# Patient Record
Sex: FEMALE | Race: WHITE | NOT HISPANIC OR LATINO | URBAN - METROPOLITAN AREA
[De-identification: names, ages, dates, MRNs, and addresses within clinical notes are randomized per-mention and may not be internally consistent; named-entity substitution may affect disease eponyms.]

---

## 2022-09-02 ENCOUNTER — TRANSFERRED RECORDS (OUTPATIENT)
Dept: HEALTH INFORMATION MANAGEMENT | Facility: CLINIC | Age: 37
End: 2022-09-02

## 2022-09-02 ENCOUNTER — MEDICAL CORRESPONDENCE (OUTPATIENT)
Dept: HEALTH INFORMATION MANAGEMENT | Facility: CLINIC | Age: 37
End: 2022-09-02

## 2023-01-17 ENCOUNTER — OFFICE VISIT (OUTPATIENT)
Dept: RHEUMATOLOGY | Facility: CLINIC | Age: 38
End: 2023-01-17
Payer: COMMERCIAL

## 2023-01-17 ENCOUNTER — ANCILLARY PROCEDURE (OUTPATIENT)
Dept: GENERAL RADIOLOGY | Facility: CLINIC | Age: 38
End: 2023-01-17
Attending: PHYSICIAN ASSISTANT
Payer: COMMERCIAL

## 2023-01-17 VITALS
WEIGHT: 147 LBS | SYSTOLIC BLOOD PRESSURE: 122 MMHG | DIASTOLIC BLOOD PRESSURE: 66 MMHG | BODY MASS INDEX: 22.28 KG/M2 | HEIGHT: 68 IN

## 2023-01-17 DIAGNOSIS — Z87.39 HISTORY OF JUVENILE RHEUMATOID ARTHRITIS: ICD-10-CM

## 2023-01-17 DIAGNOSIS — M25.50 POLYARTHRALGIA: Primary | ICD-10-CM

## 2023-01-17 DIAGNOSIS — M25.50 POLYARTHRALGIA: ICD-10-CM

## 2023-01-17 LAB
ALBUMIN SERPL BCG-MCNC: 4.8 G/DL (ref 3.5–5.2)
ALT SERPL W P-5'-P-CCNC: 17 U/L (ref 10–35)
AST SERPL W P-5'-P-CCNC: 20 U/L (ref 10–35)
CREAT SERPL-MCNC: 0.65 MG/DL (ref 0.51–0.95)
CRP SERPL-MCNC: <3 MG/L
ERYTHROCYTE [DISTWIDTH] IN BLOOD BY AUTOMATED COUNT: 12.1 % (ref 10–15)
ERYTHROCYTE [SEDIMENTATION RATE] IN BLOOD BY WESTERGREN METHOD: 10 MM/HR (ref 0–20)
GFR SERPL CREATININE-BSD FRML MDRD: >90 ML/MIN/1.73M2
HCT VFR BLD AUTO: 36.7 % (ref 35–47)
HGB BLD-MCNC: 13.4 G/DL (ref 11.7–15.7)
MCH RBC QN AUTO: 31.1 PG (ref 26.5–33)
MCHC RBC AUTO-ENTMCNC: 36.5 G/DL (ref 31.5–36.5)
MCV RBC AUTO: 85 FL (ref 78–100)
PLATELET # BLD AUTO: 171 10E3/UL (ref 150–450)
RBC # BLD AUTO: 4.31 10E6/UL (ref 3.8–5.2)
WBC # BLD AUTO: 6.8 10E3/UL (ref 4–11)

## 2023-01-17 PROCEDURE — 82040 ASSAY OF SERUM ALBUMIN: CPT | Performed by: PHYSICIAN ASSISTANT

## 2023-01-17 PROCEDURE — 84450 TRANSFERASE (AST) (SGOT): CPT | Performed by: PHYSICIAN ASSISTANT

## 2023-01-17 PROCEDURE — 36415 COLL VENOUS BLD VENIPUNCTURE: CPT | Performed by: PHYSICIAN ASSISTANT

## 2023-01-17 PROCEDURE — 99204 OFFICE O/P NEW MOD 45 MIN: CPT | Performed by: PHYSICIAN ASSISTANT

## 2023-01-17 PROCEDURE — 73130 X-RAY EXAM OF HAND: CPT | Mod: TC | Performed by: RADIOLOGY

## 2023-01-17 PROCEDURE — 73110 X-RAY EXAM OF WRIST: CPT | Mod: TC | Performed by: RADIOLOGY

## 2023-01-17 PROCEDURE — 84460 ALANINE AMINO (ALT) (SGPT): CPT | Performed by: PHYSICIAN ASSISTANT

## 2023-01-17 PROCEDURE — 86200 CCP ANTIBODY: CPT | Performed by: PHYSICIAN ASSISTANT

## 2023-01-17 PROCEDURE — 85027 COMPLETE CBC AUTOMATED: CPT | Performed by: PHYSICIAN ASSISTANT

## 2023-01-17 PROCEDURE — 82565 ASSAY OF CREATININE: CPT | Performed by: PHYSICIAN ASSISTANT

## 2023-01-17 PROCEDURE — 86431 RHEUMATOID FACTOR QUANT: CPT | Performed by: PHYSICIAN ASSISTANT

## 2023-01-17 PROCEDURE — 86140 C-REACTIVE PROTEIN: CPT | Performed by: PHYSICIAN ASSISTANT

## 2023-01-17 PROCEDURE — 85652 RBC SED RATE AUTOMATED: CPT | Performed by: PHYSICIAN ASSISTANT

## 2023-01-17 RX ORDER — VALACYCLOVIR HYDROCHLORIDE 1 G/1
1000 TABLET, FILM COATED ORAL
COMMUNITY
Start: 2022-09-16 | End: 2024-04-11

## 2023-01-17 RX ORDER — PREDNISONE 5 MG/1
TABLET ORAL
Qty: 32 TABLET | Refills: 0 | Status: SHIPPED | OUTPATIENT
Start: 2023-01-17 | End: 2023-06-29

## 2023-01-17 RX ORDER — BUPROPION HYDROCHLORIDE 150 MG/1
150 TABLET ORAL DAILY
COMMUNITY
Start: 2023-01-03 | End: 2024-04-11

## 2023-01-17 RX ORDER — TRETINOIN 0.25 MG/G
CREAM TOPICAL
COMMUNITY
Start: 2022-08-30

## 2023-01-17 RX ORDER — SPIRONOLACTONE 25 MG/1
1 TABLET ORAL DAILY
COMMUNITY
Start: 2022-12-07 | End: 2024-04-11

## 2023-01-17 RX ORDER — METRONIDAZOLE 500 MG/1
500 TABLET ORAL
COMMUNITY
Start: 2023-01-17 | End: 2023-01-24

## 2023-01-17 RX ORDER — MEDROXYPROGESTERONE ACETATE 150 MG/ML
150 INJECTION, SUSPENSION INTRAMUSCULAR
COMMUNITY
Start: 2023-01-16 | End: 2024-04-11

## 2023-01-17 NOTE — PROGRESS NOTES
"Rheumatology Clinic Visit  [facility]  Sagrario Stone, ESSENCE     Kailyn Diop MRN# [id number]   YOB: 1985 Age: 37 year old   Date of Visit: 01/17/2023  Primary care provider: No primary care provider on file.          Assessment and Plan:     1. Polyarthralgia  2. History of juvenile rheumatoid arthritis    Patient presents today for an initial evaluation of polyarthralgia.  She states that she has been seeing orthopedic for bilateral foot/ankle pain.  A CT scan was done and the report states that she has \"arthritis\".  Per patient she asked if this meant osteoarthritis and was told that it did not.  She did get cortisone injections and states that this was significantly helpful however she has been noticing increased pain in her bilateral feet/ankles.  She started to notice some pain in her wrists as well.  She was diagnosed with juvenile rheumatoid arthritis and has been off of treatment since age 18 with no recurrent symptoms until recent.  Per patient she was seronegative as a child.  Physical examination did not show any active synovitis, dactylitis, tenosynovitis or enthesopathy.  She had full joint range of motion.  Normal  strength.  Full fist formation.    Would recommend further evaluating serologies.  I would recommend a short course of low-dose prednisone given the increasing joint symptoms.  We will have the patient follow-up with me in 1 month.  At which time we will review how she did with the oral prednisone.  We will review the results of her labs and x-rays as well.  We will discuss potentially utilizing immunomodulatory therapy at that visit as well.    Plan:     1. Schedule follow-up with Sagrario Stone PA-C in 1 months.   2. Imaging: xray hand and wrists  3. Labs: CBC, Creatinine, Albumin, AST, ALT, CCP antibody, Rheumatoid factor, CRP and Sed rate today  4. Medication recommendations:   a. Prednisone 5mg: Take 10 mg (2 tablets) daily x1 week, then take 7.5 mg (1.5 " tablets) daily x1 week, then take 5 mg (1 tablet) daily x1 week then stop.  Take with food.  b. # Prednisone Risks and Benefits: The risks and benefits of prednisone were discussed in detail and the patient verbalized understanding.  The risks discussed include, but are not limited to, weight gain, fluid retention, impaired wound healing, hyperglycemia, adrenal suppression, GI upset, peptic ulcer, hepatotoxicity, aseptic necrosis of the femoral and humeral heads, osteoporosis, myopathy, tendon rupture (particularly Achilles tendon), ocular changes including an increased intraocular pressure.  I encouraged reviewing the package insert and asking any questions about the medication.      Sagrario Stone, [facility]  Rheumatology         History of Present Illness:   Kailyn Diop presents for evaluation of ankle/foot pain. 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     She reports that she stopped treating arthritis around age 18. She has had 3 pregnancies in the last 6 years. She states that with the added weight of pregnancy, she would have increased pain in her foot and ankles. She was seen by [place]. A CT scan was done to see if the tendons were the cause. She was told there was arthritis and she should see Rheum. She was given cortisone injections and states that this was very helpful. She states that the pain is starting to come back. It is not at the level that it was at before. She has some swelling around the ball of her ankle. She states that she has some wrist issues as well as she is unable to bear too much weight on them. She states that her ankles have always been stiff. She has some in her fingers as well. She feels that her joints feel similar to what they did when she was a child. No oral prednisone recently.No skin rashes or mouth sores. No hair loss. No history of pericarditis or pleuritis. No cancer history.     For her DAKSHA, she had issues with her ankles, knees and wrists. She had reconstructive jaw surgery due  to the activity in her jaw. She states that the last thing she was on was Enbrel. She states that she was on Methotrexate (SubQ), plaquenil, Prednisone and sulindac. No allergic reactions to these medications.          Review of Systems:     Constitutional: negative  Skin: negative  Eyes: negative  Ears/Nose/Throat: negative  Respiratory: No shortness of breath, dyspnea on exertion, cough, or hemoptysis  Cardiovascular: negative  Gastrointestinal: negative  Genitourinary: negative  Musculoskeletal: as above  Neurologic: negative  Psychiatric: negative  Hematologic/Lymphatic/Immunologic: negative  Endocrine: negative         Active Problem List:   There are no problems to display for this patient.           Past Medical History:   No past medical history on file.  No past surgical history on file.         Social History:     Social History     Socioeconomic History     Marital status:      Spouse name: Not on file     Number of children: Not on file     Years of education: Not on file     Highest education level: Not on file   Occupational History     Not on file   Tobacco Use     Smoking status: Not on file     Smokeless tobacco: Not on file   Substance and Sexual Activity     Alcohol use: Not on file     Drug use: Not on file     Sexual activity: Not on file   Other Topics Concern     Not on file   Social History Narrative     Not on file     Social Determinants of Health     Financial Resource Strain: Not on file   Food Insecurity: Not on file   Transportation Needs: Not on file   Physical Activity: Not on file   Stress: Not on file   Social Connections: Not on file   Intimate Partner Violence: Not on file   Housing Stability: Not on file          Family History:   No family history on file.         Allergies:   Not on File         Medications:     No current outpatient medications on file.            Physical Exam:   There were no vitals taken for this visit.  Wt Readings from Last 6 Encounters:   No data  found for Wt     Constitutional: well-developed, appearing stated age; cooperative  Eyes: nl PERRLA, conjunctiva, sclera  ENT: nl external ears, nose, hearing, lips, teeth, gums, throat. No mucositis.   No mucous membrane lesions, normal saliva pool  Neck: no mass or thyroid enlargement  Resp: no shortness of breath with normal conversation  CV: no edema  Lymph: no cervical, supraclavicular or epitrochlear nodes  MS: The TMJ, neck, shoulder, elbow, wrist, MCP/PIP/DIP, spine, hip, knee, ankle, and foot MTP/IP joints were examined and found normal. No active synovitis or altered joint anatomy. Full joint ROM. Normal  strength. No dactylitis,  tenosynovitis, enthesopathy.   Skin: no nail pitting, alopecia, rash, nodules or lesions.   Neuro: nl cranial nerves.   Psych: nl judgement, orientation, memory, affect.           Data:   Imaging:  CT Bilateral Ankles  Impression:  1. Bilateral subtalar and talonavicular arthritis left greater than right  2. Subtle cavovarus foot deformity bilaterally  3. Bilateral peroneal tendinitis left greater than right  4. Bilateral gastrocnemius contractures    Laboratory:  None recent

## 2023-01-17 NOTE — PATIENT INSTRUCTIONS
After Visit Instructions:     Thank you for coming to [facility] for your care. It is my goal to partner with you to help you reach your optimal state of health.       Plan:     Schedule follow-up with Sagrario Stone PA-C in 1 months.   Imaging: xray hand and wrists  Labs: CBC, Creatinine, Albumin, AST, ALT, CCP antibody, Rheumatoid factor, CRP and Sed rate today  Medication recommendations:   Prednisone 5mg: Take 10 mg (2 tablets) daily x1 week, then take 7.5 mg (1.5 tablets) daily x1 week, then take 5 mg (1 tablet) daily x1 week then stop.  Take with food.  # Prednisone Risks and Benefits: The risks and benefits of prednisone were discussed in detail and the patient verbalized understanding.  The risks discussed include, but are not limited to, weight gain, fluid retention, impaired wound healing, hyperglycemia, adrenal suppression, GI upset, peptic ulcer, hepatotoxicity, aseptic necrosis of the femoral and humeral heads, osteoporosis, myopathy, tendon rupture (particularly Achilles tendon), ocular changes including an increased intraocular pressure.  I encouraged reviewing the package insert and asking any questions about the medication.        Sagrario Stone PA-C  [facility]  [facility]    Contact information: [facility]  Clinic Number:  [phone]  Please call or send a [employer] message with any questions about your care

## 2023-01-17 NOTE — LETTER
January 19, 2023      Kailyn Diop  [address]  [facility] [address]        Dear ,    We are writing to inform you of your test results.    : labs returned normal as did her xray's. No change to the plan that we created at her visit yesterday. Would recommend a follow up in 1 month.      Sagrario Stone, PAC    Resulted Orders   CRP inflammation   Result Value Ref Range    CRP Inflammation <3.00 <5.00 mg/L   Cyclic Citrullinated Peptide Antibody IgG   Result Value Ref Range    Cyclic Citrullinated Peptide Antibody IgG 1.6 <7.0 U/mL      Comment:      Negative   Erythrocyte sedimentation rate auto   Result Value Ref Range    Erythrocyte Sedimentation Rate 10 0 - 20 mm/hr   Rheumatoid factor   Result Value Ref Range    Rheumatoid Factor <7 <12 IU/mL   CBC with platelets   Result Value Ref Range    WBC Count 6.8 4.0 - 11.0 10e3/uL    RBC Count 4.31 3.80 - 5.20 10e6/uL    Hemoglobin 13.4 11.7 - 15.7 g/dL    Hematocrit 36.7 35.0 - 47.0 %    MCV 85 78 - 100 fL    MCH 31.1 26.5 - 33.0 pg    MCHC 36.5 31.5 - 36.5 g/dL    RDW 12.1 10.0 - 15.0 %    Platelet Count 171 150 - 450 10e3/uL   Creatinine   Result Value Ref Range    Creatinine 0.65 0.51 - 0.95 mg/dL    GFR Estimate >90 >60 mL/min/1.73m2      Comment:      Effective December 21, 2021 eGFRcr in adults is calculated using the 2021 CKD-EPI creatinine equation which includes age and gender (Sondra et al., NEJ, DOI: 10.1056/WXWHkb8724017)   AST   Result Value Ref Range    AST 20 10 - 35 U/L   ALT   Result Value Ref Range    ALT 17 10 - 35 U/L   Albumin level   Result Value Ref Range    Albumin 4.8 3.5 - 5.2 g/dL       If you have any questions or concerns, please call the clinic at the number listed above.       Sincerely,      Sagrario Stone PA-C

## 2023-01-18 LAB
CCP AB SER IA-ACNC: 1.6 U/ML
RHEUMATOID FACT SER NEPH-ACNC: <7 IU/ML

## 2023-06-29 ENCOUNTER — TELEPHONE (OUTPATIENT)
Dept: FAMILY MEDICINE | Facility: OTHER | Age: 38
End: 2023-06-29

## 2023-06-29 ENCOUNTER — LAB (OUTPATIENT)
Dept: LAB | Facility: CLINIC | Age: 38
End: 2023-06-29
Payer: COMMERCIAL

## 2023-06-29 ENCOUNTER — OFFICE VISIT (OUTPATIENT)
Dept: RHEUMATOLOGY | Facility: CLINIC | Age: 38
End: 2023-06-29
Payer: COMMERCIAL

## 2023-06-29 VITALS — HEART RATE: 56 BPM | SYSTOLIC BLOOD PRESSURE: 108 MMHG | DIASTOLIC BLOOD PRESSURE: 72 MMHG

## 2023-06-29 DIAGNOSIS — M06.09 SERONEGATIVE RHEUMATOID ARTHRITIS OF MULTIPLE SITES (H): Primary | ICD-10-CM

## 2023-06-29 DIAGNOSIS — Z79.899 HIGH RISK MEDICATION USE: ICD-10-CM

## 2023-06-29 DIAGNOSIS — Z87.39 HISTORY OF JUVENILE RHEUMATOID ARTHRITIS: ICD-10-CM

## 2023-06-29 DIAGNOSIS — M06.09 SERONEGATIVE RHEUMATOID ARTHRITIS OF MULTIPLE SITES (H): ICD-10-CM

## 2023-06-29 LAB
HBV CORE AB SERPL QL IA: NONREACTIVE
HBV SURFACE AG SERPL QL IA: NONREACTIVE
HCV AB SERPL QL IA: NONREACTIVE

## 2023-06-29 PROCEDURE — 87340 HEPATITIS B SURFACE AG IA: CPT

## 2023-06-29 PROCEDURE — 86481 TB AG RESPONSE T-CELL SUSP: CPT

## 2023-06-29 PROCEDURE — 86704 HEP B CORE ANTIBODY TOTAL: CPT

## 2023-06-29 PROCEDURE — 86803 HEPATITIS C AB TEST: CPT

## 2023-06-29 PROCEDURE — 36415 COLL VENOUS BLD VENIPUNCTURE: CPT

## 2023-06-29 PROCEDURE — 99214 OFFICE O/P EST MOD 30 MIN: CPT | Performed by: PHYSICIAN ASSISTANT

## 2023-06-29 RX ORDER — MEDROXYPROGESTERONE ACETATE 150 MG/ML
50 INJECTION, SUSPENSION INTRAMUSCULAR WEEKLY
Qty: 4 ML | Refills: 2 | Status: SHIPPED | OUTPATIENT
Start: 2023-06-29 | End: 2023-09-12

## 2023-06-29 RX ORDER — PREDNISONE 5 MG/1
TABLET ORAL
Qty: 32 TABLET | Refills: 0 | Status: SHIPPED | OUTPATIENT
Start: 2023-06-29 | End: 2024-04-11

## 2023-06-29 NOTE — PATIENT INSTRUCTIONS
After Visit Instructions:     Thank you for coming to [facility] for your care. It is my goal to partner with you to help you reach your optimal state of health.       Plan:     Schedule follow-up with Sagrario Stone PA-C in 3 months.   Labs: Hep B, Hep C and TB today; plan to check a CBC, creatinine, albumin, AST, and ALT after being on the Enbrel for 6 months  Medication recommendations:  Prednisone 5mg: Take 10 mg (2 tablets) daily x1 week, then take 7.5 mg (1.5 tablets) daily x1 week, then take 5 mg (1 tablet) daily x1 week then stop.  Take with food and in the morning.  Enbrel 50mg/ML weekly SubQ  # Etanercept (Enbrel) Risks and Benefits: The risks and benefits of etanercept were discussed in detail and the patient verbalized understanding.  The risks discussed include, but are not limited to, the risk for hypersensitivity, anaphylaxis, anaphylactoid reactions, an increased risk for serious infections leading to hospitalization or death, a possible increased risk for lymphoma and other malignancies, a possible worsening of demyelinating diseases, a possible worsening of heart failure, possible reactivation of hepatitis B, and possible reactivation of latent tuberculosis.  Subcutaneous injections may result in injection site reactions and/or pain at the site of injection.  It was discussed that the medication would need to be discontinued if a serious infection develops.  It was discussed that live vaccinations should not be received while using etanercept or within 30 days prior to starting etanercept.  I encouraged reviewing the package insert and asking any questions about the medication.        Sagrario Stone PA-C  [facility]  [facility]    Contact information: [facility]  Clinic Number:  [phone]  Please call or send a [employer] message with any questions about your care

## 2023-06-29 NOTE — TELEPHONE ENCOUNTER
PA Initiation    Medication: ENBREL SURECLICK 50 MG/ML SC SOAJ  Insurance Company:    Pharmacy Filling the Rx:    Filling Pharmacy Phone:    Filling Pharmacy Fax:    Start Date: 6/29/2023    : [id number]                  DATE 06/29/2023    Spoke to pt she does not have [employer] access yet would like us to email her with approval and copay card info        [email or web address]      GEORGIA Meneses, [facility]  Specialty Pharmacy Clinic Liaison     [facility]    [email or web address]     Phone: [phone]  Fax: [phone]

## 2023-06-29 NOTE — TELEPHONE ENCOUNTER
Prior Authorization Approval    Medication: ENBREL SURECLICK 50 MG/ML SC SOAJ  Authorization Effective Date: 6/29/2023  Authorization Expiration Date: 12/26/2023  Approved Dose/Quantity:  50mg  Reference #:     Insurance Company: [employer] - Phone [phone] Fax [phone]  Expected CoPay:       CoPay Card Available: Yes    Financial Assistance Needed:  Sent via email to pt  Which Pharmacy is filling the prescription: [address]  Pharmacy Notified: Yes  Patient Notified: Yes    GEORGIA Meneses, [facility]  Specialty Pharmacy Clinic Liaison     [facility]    [email or web address]     Phone: [phone]  Fax: [phone]

## 2023-06-29 NOTE — PROGRESS NOTES
Rheumatology Clinic Visit  [facility]  ESSENCE Durán     Kailyn Diop MRN# [id number]   YOB: 1985 Age: 37 year old   Date of Visit: 06/29/2023  Primary care provider: No primary care provider on file.          Assessment and Plan:     1. Seronegative Rheumatoid arthritis  2. History of juvenile rheumatoid arthritis  3. High risk medication use    Patient presents today for follow-up regarding her seronegative rheumatoid arthritis.  She reports significant benefit while being on the prednisone.  Unfortunately with being off of the prednisone her symptoms have returned.  She is interested in another course of prednisone as well as restarting the Enbrel.    Given the increased joint symptoms and her history of DAKSHA along with the improvement with prednisone, we will restart her on Enbrel monotherapy.  We will check her hepatitis B, hepatitis C and tuberculosis status today.  Monitoring labs were normal.  We will check again in 6 months after starting the Enbrel.    Plan:     1. Schedule follow-up with Sagrario Stone PA-C in 3 months.   2. Labs: Hep B, Hep C and TB today; plan to check a CBC, creatinine, albumin, AST, and ALT after being on the Enbrel for 6 months  3. Medication recommendations:  a. Prednisone 5mg: Take 10 mg (2 tablets) daily x1 week, then take 7.5 mg (1.5 tablets) daily x1 week, then take 5 mg (1 tablet) daily x1 week then stop.  Take with food and in the morning.  b. Enbrel 50mg/ML weekly SubQ  i. # Etanercept (Enbrel) Risks and Benefits: The risks and benefits of etanercept were discussed in detail and the patient verbalized understanding.  The risks discussed include, but are not limited to, the risk for hypersensitivity, anaphylaxis, anaphylactoid reactions, an increased risk for serious infections leading to hospitalization or death, a possible increased risk for lymphoma and other malignancies, a possible worsening of demyelinating diseases, a possible worsening  of heart failure, possible reactivation of hepatitis B, and possible reactivation of latent tuberculosis.  Subcutaneous injections may result in injection site reactions and/or pain at the site of injection.  It was discussed that the medication would need to be discontinued if a serious infection develops.  It was discussed that live vaccinations should not be received while using etanercept or within 30 days prior to starting etanercept.  I encouraged reviewing the package insert and asking any questions about the medication.      ESSENCE Durán  Rheumatology         History of Present Illness:   Kailyn Diop presents for evaluation of ankle/foot pain. 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     Interval history June 29, 2023:  She states that the Prednisone was quite helpful.She would like to go back on Enbrel as it worked well for her in the past.  Unfortunately now with being off of the prednisone she has started to have an increase in her joint symptoms.    HPI from Consult:  She reports that she stopped treating arthritis around age 18. She has had 3 pregnancies in the last 6 years. She states that with the added weight of pregnancy, she would have increased pain in her foot and ankles. She was seen by [place]. A CT scan was done to see if the tendons were the cause. She was told there was arthritis and she should see Rheum. She was given cortisone injections and states that this was very helpful. She states that the pain is starting to come back. It is not at the level that it was at before. She has some swelling around the ball of her ankle. She states that she has some wrist issues as well as she is unable to bear too much weight on them. She states that her ankles have always been stiff. She has some in her fingers as well. She feels that her joints feel similar to what they did when she was a child. No oral prednisone recently.No skin rashes or mouth sores. No hair loss. No history of pericarditis or  pleuritis. No cancer history.     For her DAKSHA, she had issues with her ankles, knees and wrists. She had reconstructive jaw surgery due to the activity in her jaw. She states that the last thing she was on was Enbrel. She states that she was on Methotrexate (SubQ), plaquenil, Prednisone and sulindac. No allergic reactions to these medications.          Review of Systems:     Constitutional: negative  Skin: negative  Eyes: negative  Ears/Nose/Throat: negative  Respiratory: No shortness of breath, dyspnea on exertion, cough, or hemoptysis  Cardiovascular: negative  Gastrointestinal: negative  Genitourinary: negative  Musculoskeletal: as above  Neurologic: negative  Psychiatric: negative  Hematologic/Lymphatic/Immunologic: negative  Endocrine: negative         Active Problem List:   There are no problems to display for this patient.           Past Medical History:   History reviewed. No pertinent past medical history.  History reviewed. No pertinent surgical history.         Social History:     Social History     Socioeconomic History     Marital status:      Spouse name: Not on file     Number of children: Not on file     Years of education: Not on file     Highest education level: Not on file   Occupational History     Not on file   Tobacco Use     Smoking status: Former     Types: Cigarettes     Smokeless tobacco: Never   Substance and Sexual Activity     Alcohol use: Not on file     Drug use: Not on file     Sexual activity: Not on file   Other Topics Concern     Not on file   Social History Narrative     Not on file     Social Determinants of Health     Financial Resource Strain: Not on file   Food Insecurity: Not on file   Transportation Needs: Not on file   Physical Activity: Not on file   Stress: Not on file   Social Connections: Not on file   Intimate Partner Violence: Not on file   Housing Stability: Not on file          Family History:   History reviewed. No pertinent family history.          Allergies:   No Known Allergies         Medications:     Current Outpatient Medications   Medication Sig Dispense Refill     buPROPion (WELLBUTRIN XL) 150 MG 24 hr tablet Take 1 tablet by mouth daily       etanercept (ENBREL SURECLICK) 50 MG/ML autoinjector Inject 50 mg Subcutaneous once a week Hold for signs of infection, and seek medical attention. 4 mL 2     medroxyPROGESTERone (DEPO-PROVERA) 150 MG/ML IM injection Inject 150 mg into the muscle       predniSONE (DELTASONE) 5 MG tablet Take 10 mg (2 tablets) daily x1 week, then take 7.5 mg (1.5 tablets) daily x1 week, then take 5 mg (1 tablet) daily x1 week then stop.  Take with food. 32 tablet 0     spironolactone (ALDACTONE) 25 MG tablet Take 1 tablet by mouth daily       tretinoin (RETIN-A) 0.025 % external cream        valACYclovir (VALTREX) 1000 mg tablet Take 1,000 mg by mouth              Physical Exam:   Blood pressure 108/72, pulse 56.  Wt Readings from Last 6 Encounters:   01/17/23 66.7 kg (147 lb)     Constitutional: well-developed, appearing stated age; cooperative  Eyes: nl conjunctiva, sclera  ENT: nl external ears, nose, hearing, lips  Neck: no visible mass or thyroid enlargement  Resp: no shortness of breath with normal conversation  Psych: nl judgement, orientation, memory, affect.           Data:   Imaging:  CT Bilateral Ankles  Impression:  1. Bilateral subtalar and talonavicular arthritis left greater than right  2. Subtle cavovarus foot deformity bilaterally  3. Bilateral peroneal tendinitis left greater than right  4. Bilateral gastrocnemius contractures    X-ray bilateral hands and wrist 1/17/2023  IMPRESSION:  1.  Right hand and wrist: Normal joint spacing and alignment. No  periarticular erosion. No fracture.  2.  Left hand and wrist: Normal joint spacing and alignment. No  periarticular erosion. No fracture.    Laboratory:  1/17/2023  Creatinine 0.65, GFR greater than 90  Albumin 4.8  ALT 17, AST 20  CRP less than 3.0  CCP antibody  1.6  Rheumatoid factor less than 7  White blood cell count 6.8, hemoglobin 13.4, plate count 171  Sed rate 10

## 2023-07-01 LAB
QUANTIFERON MITOGEN: 10 IU/ML
QUANTIFERON NIL TUBE: 0.03 IU/ML
QUANTIFERON TB1 TUBE: 0.22 IU/ML

## 2023-07-02 LAB
GAMMA INTERFERON BACKGROUND BLD IA-ACNC: 0.03 IU/ML
M TB IFN-G BLD-IMP: NEGATIVE
M TB IFN-G CD4+ BCKGRND COR BLD-ACNC: 9.97 IU/ML
MITOGEN IGNF BCKGRD COR BLD-ACNC: 0.14 IU/ML
MITOGEN IGNF BCKGRD COR BLD-ACNC: 0.19 IU/ML
QUANTIFERON TB2 TUBE: 0.17

## 2023-07-06 NOTE — TELEPHONE ENCOUNTER
Chatted with [facility] to find out status of order. Patients insurance requires them to enroll with [employer], which will enroll the patient in the co-pay card and once those funds are exhausted, the insurance will pay the rest. Spoke with Kailyn and explained the program and steps she needs to take. She was understanding and I urged her to call back with any issues.

## 2023-08-13 ENCOUNTER — HEALTH MAINTENANCE LETTER (OUTPATIENT)
Age: 38
End: 2023-08-13

## 2023-09-12 DIAGNOSIS — Z87.39 HISTORY OF JUVENILE RHEUMATOID ARTHRITIS: ICD-10-CM

## 2023-09-12 DIAGNOSIS — M06.09 SERONEGATIVE RHEUMATOID ARTHRITIS OF MULTIPLE SITES (H): ICD-10-CM

## 2023-09-12 RX ORDER — MEDROXYPROGESTERONE ACETATE 150 MG/ML
50 INJECTION, SUSPENSION INTRAMUSCULAR WEEKLY
Qty: 4 ML | Refills: 2 | Status: SHIPPED | OUTPATIENT
Start: 2023-09-12 | End: 2023-10-20

## 2023-09-25 ENCOUNTER — LAB (OUTPATIENT)
Dept: LAB | Facility: CLINIC | Age: 38
End: 2023-09-25
Payer: COMMERCIAL

## 2023-09-25 ENCOUNTER — OFFICE VISIT (OUTPATIENT)
Dept: RHEUMATOLOGY | Facility: CLINIC | Age: 38
End: 2023-09-25
Payer: COMMERCIAL

## 2023-09-25 VITALS
DIASTOLIC BLOOD PRESSURE: 78 MMHG | BODY MASS INDEX: 22.32 KG/M2 | WEIGHT: 146.8 LBS | HEART RATE: 85 BPM | SYSTOLIC BLOOD PRESSURE: 110 MMHG | OXYGEN SATURATION: 100 %

## 2023-09-25 DIAGNOSIS — Z87.39 HISTORY OF JUVENILE RHEUMATOID ARTHRITIS: ICD-10-CM

## 2023-09-25 DIAGNOSIS — Z79.899 HIGH RISK MEDICATION USE: ICD-10-CM

## 2023-09-25 DIAGNOSIS — M06.09 SERONEGATIVE RHEUMATOID ARTHRITIS OF MULTIPLE SITES (H): Primary | ICD-10-CM

## 2023-09-25 DIAGNOSIS — M06.09 SERONEGATIVE RHEUMATOID ARTHRITIS OF MULTIPLE SITES (H): ICD-10-CM

## 2023-09-25 LAB
ALBUMIN SERPL BCG-MCNC: 5 G/DL (ref 3.5–5.2)
ALT SERPL W P-5'-P-CCNC: 31 U/L (ref 0–50)
AST SERPL W P-5'-P-CCNC: 23 U/L (ref 0–45)
CREAT SERPL-MCNC: 0.71 MG/DL (ref 0.51–0.95)
CRP SERPL-MCNC: <3 MG/L
EGFRCR SERPLBLD CKD-EPI 2021: >90 ML/MIN/1.73M2
ERYTHROCYTE [DISTWIDTH] IN BLOOD BY AUTOMATED COUNT: 12.1 % (ref 10–15)
ERYTHROCYTE [SEDIMENTATION RATE] IN BLOOD BY WESTERGREN METHOD: 18 MM/HR (ref 0–20)
HCT VFR BLD AUTO: 38.5 % (ref 35–47)
HGB BLD-MCNC: 13.6 G/DL (ref 11.7–15.7)
MCH RBC QN AUTO: 32.4 PG (ref 26.5–33)
MCHC RBC AUTO-ENTMCNC: 35.3 G/DL (ref 31.5–36.5)
MCV RBC AUTO: 92 FL (ref 78–100)
PLATELET # BLD AUTO: 196 10E3/UL (ref 150–450)
RBC # BLD AUTO: 4.2 10E6/UL (ref 3.8–5.2)
WBC # BLD AUTO: 5.7 10E3/UL (ref 4–11)

## 2023-09-25 PROCEDURE — 36415 COLL VENOUS BLD VENIPUNCTURE: CPT

## 2023-09-25 PROCEDURE — 82565 ASSAY OF CREATININE: CPT

## 2023-09-25 PROCEDURE — 85652 RBC SED RATE AUTOMATED: CPT

## 2023-09-25 PROCEDURE — 86140 C-REACTIVE PROTEIN: CPT

## 2023-09-25 PROCEDURE — 84460 ALANINE AMINO (ALT) (SGPT): CPT

## 2023-09-25 PROCEDURE — 85027 COMPLETE CBC AUTOMATED: CPT

## 2023-09-25 PROCEDURE — 82040 ASSAY OF SERUM ALBUMIN: CPT

## 2023-09-25 PROCEDURE — 84450 TRANSFERASE (AST) (SGOT): CPT

## 2023-09-25 PROCEDURE — 99214 OFFICE O/P EST MOD 30 MIN: CPT | Performed by: PHYSICIAN ASSISTANT

## 2023-09-25 RX ORDER — SULFASALAZINE 500 MG/1
TABLET ORAL
Qty: 120 TABLET | Refills: 2 | Status: SHIPPED | OUTPATIENT
Start: 2023-09-25 | End: 2024-07-11

## 2023-09-25 NOTE — PROGRESS NOTES
Rheumatology Clinic Visit  [facility]  ESSENCE Durán     Kailyn Diop MRN# [id number]   YOB: 1985 Age: 37 year old   Date of Visit: 09/25/2023  Primary care provider: No primary care provider on file.          Assessment and Plan:     1. Seronegative Rheumatoid arthritis  2. History of juvenile rheumatoid arthritis  3. High risk medication use    Patient presents today for follow-up regarding her seronegative rheumatoid arthritis.  Unfortunately she has not had the significant benefit with restarting Enbrel that she had in the past.  She still notices a lot of pain particularly of the left ankle.  She has had improvement particularly of her wrist and of her knees.  Physical examination today does show some swelling over the left lateral malleolus.  No active synovitis over the MCPs or PIPs bilaterally.  Full fist formation.  Previous laboratory evaluations were reviewed, results below.    We discussed different options today including adding on an oral medication to the Enbrel versus changing the Enbrel to Humira.  After further discussion of both options the patient elected to continue on the Enbrel and start sulfasalazine.  Side effects of this medication were reviewed with the patient.  Plan to recheck labs today and in 1 month to evaluate for any medication toxicity.  Plan to have patient follow-up with me in 3 months.  If she does not have any improvement in 3 months, may consider switching the Enbrel to Humira.      Plan:     Schedule follow-up with Sagrario Stone PA-C in 3 months.   Labs:  CBC, creatinine, albumin, AST, and ALT, CRP and Sed rate today and in 1 month   Medication recommendations:  Continue Enbrel 50mg/ML weekly SubQ  Sulfasalazine 500mg: Take 1 tablet daily for 1 week, then take 1 tablet twice daily for 1 week, then take 1 tab in AM and 2 tabs in PM for 1 week, then take 2 tabs twice daily. Take this medication with food.  # Sulfasalazine Risks and Benefits:  The risks and benefits of sulfasalazine were discussed in detail and the patient verbalized understanding.  The risks discussed include, but are not limited to, the risk for hypersensitivity, anaphylaxis, anaphylactoid reactions, infections, bone marrow suppression,  hepatotoxicity, nausea, vomiting, and GI upset.  Oligospermia may occur in males.  I encouraged reviewing the package insert and asking any questions about the medication.      Sagrario Stone, [facility]  Rheumatology         History of Present Illness:   Kailyn Diop presents for evaluation of ankle/foot pain. 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     Rheumatological history:  Previous medications tried: Methotrexate (SubQ) (felt ill), plaquenil, Prednisone (prednisone) and sulindac  Current medications: Enbrel 50mg/ml weekly      Interval history September 25, 2023:  The enbrel is going okay. She states that she is not pain free and the pain is more severe than she expected. She is unsure what the cause of the pain. She did have pain free days after the steroid injections at [facility]. Most of her pain is in her left ankle. Wrist is doing okay as are the knees.     Interval history June 29, 2023:  She states that the Prednisone was quite helpful.She would like to go back on Enbrel as it worked well for her in the past.  Unfortunately now with being off of the prednisone she has started to have an increase in her joint symptoms.    HPI from Consult:  She reports that she stopped treating arthritis around age 18. She has had 3 pregnancies in the last 6 years. She states that with the added weight of pregnancy, she would have increased pain in her foot and ankles. She was seen by [facility]. A CT scan was done to see if the tendons were the cause. She was told there was arthritis and she should see Rheum. She was given cortisone injections and states that this was very helpful. She states that the pain is starting to come back. It is not at the level that it was at  before. She has some swelling around the ball of her ankle. She states that she has some wrist issues as well as she is unable to bear too much weight on them. She states that her ankles have always been stiff. She has some in her fingers as well. She feels that her joints feel similar to what they did when she was a child. No oral prednisone recently.No skin rashes or mouth sores. No hair loss. No history of pericarditis or pleuritis. No cancer history.     For her DAKSHA, she had issues with her ankles, knees and wrists. She had reconstructive jaw surgery due to the activity in her jaw. She states that the last thing she was on was Enbrel. She states that she was on Methotrexate (SubQ), plaquenil, Prednisone and sulindac. No allergic reactions to these medications.          Review of Systems:     Constitutional: negative  Skin: negative  Eyes: negative  Ears/Nose/Throat: negative  Respiratory: No shortness of breath, dyspnea on exertion, cough, or hemoptysis  Cardiovascular: negative  Gastrointestinal: negative  Genitourinary: negative  Musculoskeletal: as above  Neurologic: negative  Psychiatric: negative  Hematologic/Lymphatic/Immunologic: negative  Endocrine: negative         Active Problem List:   There are no problems to display for this patient.           Past Medical History:   No past medical history on file.  No past surgical history on file.         Social History:     Social History     Socioeconomic History    Marital status:      Spouse name: Not on file    Number of children: Not on file    Years of education: Not on file    Highest education level: Not on file   Occupational History    Not on file   Tobacco Use    Smoking status: Former     Types: Cigarettes    Smokeless tobacco: Never   Substance and Sexual Activity    Alcohol use: Not on file    Drug use: Not on file    Sexual activity: Not on file   Other Topics Concern    Not on file   Social History Narrative    Not on file     Social  Determinants of Health     Financial Resource Strain: Not on file   Food Insecurity: Not on file   Transportation Needs: Not on file   Physical Activity: Not on file   Stress: Not on file   Social Connections: Not on file   Interpersonal Safety: Not on file   Housing Stability: Not on file          Family History:   No family history on file.         Allergies:   No Known Allergies         Medications:     Current Outpatient Medications   Medication Sig Dispense Refill    buPROPion (WELLBUTRIN XL) 150 MG 24 hr tablet Take 1 tablet by mouth daily      etanercept (ENBREL SURECLICK) 50 MG/ML autoinjector Inject 50 mg Subcutaneous once a week Hold for signs of infection, and seek medical attention. 4 mL 2    medroxyPROGESTERone (DEPO-PROVERA) 150 MG/ML IM injection Inject 150 mg into the muscle      sulfaSALAzine (AZULFIDINE) 500 MG tablet Take 1 tablet daily for 1 week, then take 1 tablet twice daily for 1 week, then take 1 tab in AM and 2 tabs in PM for 1 week, then take 2 tabs twice daily. Take this medication with food. 120 tablet 2    predniSONE (DELTASONE) 5 MG tablet Take 10 mg (2 tablets) daily x1 week, then take 7.5 mg (1.5 tablets) daily x1 week, then take 5 mg (1 tablet) daily x1 week then stop.  Take with food. (Patient not taking: Reported on 9/25/2023) 32 tablet 0    spironolactone (ALDACTONE) 25 MG tablet Take 1 tablet by mouth daily (Patient not taking: Reported on 9/25/2023)      tretinoin (RETIN-A) 0.025 % external cream  (Patient not taking: Reported on 9/25/2023)      valACYclovir (VALTREX) 1000 mg tablet Take 1,000 mg by mouth (Patient not taking: Reported on 9/25/2023)              Physical Exam:   Blood pressure 110/78, pulse 85, weight 66.6 kg (146 lb 12.8 oz), SpO2 100 %.  Wt Readings from Last 6 Encounters:   09/25/23 66.6 kg (146 lb 12.8 oz)   01/17/23 66.7 kg (147 lb)     Constitutional: well-developed, appearing stated age; cooperative  Eyes: nl conjunctiva, sclera  ENT: nl external ears,  nose, hearing, lips  Neck: no visible mass or thyroid enlargement  Resp: no shortness of breath with normal conversation  MSK: Swelling over the lateral malleolus of the left ankle.  No synovitis over the MCPs or PIPs bilaterally, full fist formation.  Psych: nl judgement, orientation, memory, affect.           Data:   Imaging:  CT Bilateral Ankles  Impression:  1. Bilateral subtalar and talonavicular arthritis left greater than right  2. Subtle cavovarus foot deformity bilaterally  3. Bilateral peroneal tendinitis left greater than right  4. Bilateral gastrocnemius contractures    X-ray bilateral hands and wrist 1/17/2023  IMPRESSION:  1.  Right hand and wrist: Normal joint spacing and alignment. No  periarticular erosion. No fracture.  2.  Left hand and wrist: Normal joint spacing and alignment. No  periarticular erosion. No fracture.    Laboratory:  1/17/2023  Creatinine 0.65, GFR greater than 90  Albumin 4.8  ALT 17, AST 20  CRP less than 3.0  CCP antibody 1.6  Rheumatoid factor less than 7  White blood cell count 6.8, hemoglobin 13.4, plate count 171  Sed rate 10      6/29/2023  TB negative  Hep B and Hep non-reactive

## 2023-09-25 NOTE — PATIENT INSTRUCTIONS
After Visit Instructions:     Thank you for coming to [facility] for your care. It is my goal to partner with you to help you reach your optimal state of health.       Plan:     Schedule follow-up with Sagrario Stone PA-C in 3 months.   Labs:  CBC, creatinine, albumin, AST, and ALT, CRP and Sed rate today and in 1 month   Medication recommendations:  Continue Enbrel 50mg/ML weekly SubQ  Sulfasalazine 500mg: Take 1 tablet daily for 1 week, then take 1 tablet twice daily for 1 week, then take 1 tab in AM and 2 tabs in PM for 1 week, then take 2 tabs twice daily. Take this medication with food.  # Sulfasalazine Risks and Benefits: The risks and benefits of sulfasalazine were discussed in detail and the patient verbalized understanding.  The risks discussed include, but are not limited to, the risk for hypersensitivity, anaphylaxis, anaphylactoid reactions, infections, bone marrow suppression,  hepatotoxicity, nausea, vomiting, and GI upset.  Oligospermia may occur in males.  I encouraged reviewing the package insert and asking any questions about the medication.          Sagrario Stone PA-C  [facility]  [facility]    Contact information: [facility]  Clinic Number:  [phone]  Please call or send a [employer] message with any questions about your care

## 2023-10-20 DIAGNOSIS — M06.09 SERONEGATIVE RHEUMATOID ARTHRITIS OF MULTIPLE SITES (H): ICD-10-CM

## 2023-10-20 DIAGNOSIS — Z87.39 HISTORY OF JUVENILE RHEUMATOID ARTHRITIS: ICD-10-CM

## 2023-10-20 RX ORDER — MEDROXYPROGESTERONE ACETATE 150 MG/ML
50 INJECTION, SUSPENSION INTRAMUSCULAR WEEKLY
Qty: 4 ML | Refills: 2 | Status: SHIPPED | OUTPATIENT
Start: 2023-10-20 | End: 2024-04-11

## 2023-10-23 ENCOUNTER — LAB (OUTPATIENT)
Dept: LAB | Facility: CLINIC | Age: 38
End: 2023-10-23
Payer: COMMERCIAL

## 2023-10-23 DIAGNOSIS — Z79.899 HIGH RISK MEDICATION USE: ICD-10-CM

## 2023-10-23 DIAGNOSIS — M06.09 SERONEGATIVE RHEUMATOID ARTHRITIS OF MULTIPLE SITES (H): ICD-10-CM

## 2023-10-23 LAB
ALBUMIN SERPL BCG-MCNC: 4.9 G/DL (ref 3.5–5.2)
ALT SERPL W P-5'-P-CCNC: 53 U/L (ref 0–50)
AST SERPL W P-5'-P-CCNC: 43 U/L (ref 0–45)
CREAT SERPL-MCNC: 0.65 MG/DL (ref 0.51–0.95)
CRP SERPL-MCNC: <3 MG/L
EGFRCR SERPLBLD CKD-EPI 2021: >90 ML/MIN/1.73M2
ERYTHROCYTE [DISTWIDTH] IN BLOOD BY AUTOMATED COUNT: 12.5 % (ref 10–15)
ERYTHROCYTE [SEDIMENTATION RATE] IN BLOOD BY WESTERGREN METHOD: 11 MM/HR (ref 0–20)
HCT VFR BLD AUTO: 36.5 % (ref 35–47)
HGB BLD-MCNC: 12.5 G/DL (ref 11.7–15.7)
MCH RBC QN AUTO: 32.1 PG (ref 26.5–33)
MCHC RBC AUTO-ENTMCNC: 34.2 G/DL (ref 31.5–36.5)
MCV RBC AUTO: 94 FL (ref 78–100)
PLATELET # BLD AUTO: 176 10E3/UL (ref 150–450)
RBC # BLD AUTO: 3.89 10E6/UL (ref 3.8–5.2)
WBC # BLD AUTO: 4.9 10E3/UL (ref 4–11)

## 2023-10-23 PROCEDURE — 84450 TRANSFERASE (AST) (SGOT): CPT

## 2023-10-23 PROCEDURE — 82040 ASSAY OF SERUM ALBUMIN: CPT

## 2023-10-23 PROCEDURE — 82565 ASSAY OF CREATININE: CPT

## 2023-10-23 PROCEDURE — 36415 COLL VENOUS BLD VENIPUNCTURE: CPT

## 2023-10-23 PROCEDURE — 84460 ALANINE AMINO (ALT) (SGPT): CPT

## 2023-10-23 PROCEDURE — 86140 C-REACTIVE PROTEIN: CPT

## 2023-10-23 PROCEDURE — 85652 RBC SED RATE AUTOMATED: CPT

## 2023-10-23 PROCEDURE — 85027 COMPLETE CBC AUTOMATED: CPT

## 2023-12-04 ENCOUNTER — TELEPHONE (OUTPATIENT)
Dept: RHEUMATOLOGY | Facility: CLINIC | Age: 38
End: 2023-12-04
Payer: COMMERCIAL

## 2023-12-04 NOTE — TELEPHONE ENCOUNTER
Prior Authorization Approval    Medication: ENBREL SURECLICK 50 MG/ML SC SOAJ  Authorization Effective Date: 11/4/2023  Authorization Expiration Date: 12/3/2024  Approved Dose/Quantity: 4 pens per 28 days  Reference #: [id number]   Insurance Company: [employer] - Phone [phone] Fax [phone]  Expected CoPay: $    CoPay Card Available:      Financial Assistance Needed: N/A  [facility] [address]  Pharmacy Notified: Not needed  Patient Notified: Not needed          PA Initiation    Medication: ENBREL SURECLICK 50 MG/ML SC SOAJ  Insurance Company: [employer] - Phone [phone] Fax [phone]  [facility] [address]  Filling Pharmacy Phone:    Filling Pharmacy Fax:    Start Date: 12/4/2023

## 2024-01-04 ENCOUNTER — TELEPHONE (OUTPATIENT)
Dept: RHEUMATOLOGY | Facility: CLINIC | Age: 39
End: 2024-01-04

## 2024-01-04 ENCOUNTER — LAB (OUTPATIENT)
Dept: LAB | Facility: CLINIC | Age: 39
End: 2024-01-04
Payer: COMMERCIAL

## 2024-01-04 ENCOUNTER — OFFICE VISIT (OUTPATIENT)
Dept: RHEUMATOLOGY | Facility: CLINIC | Age: 39
End: 2024-01-04
Payer: COMMERCIAL

## 2024-01-04 VITALS
BODY MASS INDEX: 22.55 KG/M2 | SYSTOLIC BLOOD PRESSURE: 102 MMHG | DIASTOLIC BLOOD PRESSURE: 68 MMHG | WEIGHT: 148.3 LBS | HEART RATE: 64 BPM | OXYGEN SATURATION: 97 %

## 2024-01-04 DIAGNOSIS — M06.09 SERONEGATIVE RHEUMATOID ARTHRITIS OF MULTIPLE SITES (H): Primary | ICD-10-CM

## 2024-01-04 DIAGNOSIS — Z87.39 HISTORY OF JUVENILE RHEUMATOID ARTHRITIS: ICD-10-CM

## 2024-01-04 DIAGNOSIS — M06.09 SERONEGATIVE RHEUMATOID ARTHRITIS OF MULTIPLE SITES (H): ICD-10-CM

## 2024-01-04 DIAGNOSIS — Z79.899 HIGH RISK MEDICATION USE: ICD-10-CM

## 2024-01-04 LAB
ALBUMIN SERPL BCG-MCNC: 4.7 G/DL (ref 3.5–5.2)
ALT SERPL W P-5'-P-CCNC: 49 U/L (ref 0–50)
AST SERPL W P-5'-P-CCNC: 25 U/L (ref 0–45)
CREAT SERPL-MCNC: 0.65 MG/DL (ref 0.51–0.95)
EGFRCR SERPLBLD CKD-EPI 2021: >90 ML/MIN/1.73M2
ERYTHROCYTE [DISTWIDTH] IN BLOOD BY AUTOMATED COUNT: 12.3 % (ref 10–15)
HCT VFR BLD AUTO: 39.5 % (ref 35–47)
HGB BLD-MCNC: 13.7 G/DL (ref 11.7–15.7)
MCH RBC QN AUTO: 32.4 PG (ref 26.5–33)
MCHC RBC AUTO-ENTMCNC: 34.7 G/DL (ref 31.5–36.5)
MCV RBC AUTO: 93 FL (ref 78–100)
PLATELET # BLD AUTO: 174 10E3/UL (ref 150–450)
RBC # BLD AUTO: 4.23 10E6/UL (ref 3.8–5.2)
WBC # BLD AUTO: 6.2 10E3/UL (ref 4–11)

## 2024-01-04 PROCEDURE — 84460 ALANINE AMINO (ALT) (SGPT): CPT

## 2024-01-04 PROCEDURE — 36415 COLL VENOUS BLD VENIPUNCTURE: CPT

## 2024-01-04 PROCEDURE — 85027 COMPLETE CBC AUTOMATED: CPT

## 2024-01-04 PROCEDURE — 82565 ASSAY OF CREATININE: CPT

## 2024-01-04 PROCEDURE — 84450 TRANSFERASE (AST) (SGOT): CPT

## 2024-01-04 PROCEDURE — 99214 OFFICE O/P EST MOD 30 MIN: CPT | Performed by: PHYSICIAN ASSISTANT

## 2024-01-04 PROCEDURE — 82040 ASSAY OF SERUM ALBUMIN: CPT

## 2024-01-04 NOTE — PROGRESS NOTES
Rheumatology Clinic Visit  [facility]  ESSENCE Durán     Kailyn Diop MRN# [id number]   YOB: 1985 Age: 38 year old   Date of Visit: 01/04/2024  Primary care provider: No primary care provider on file.          Assessment and Plan:     1. Seronegative Rheumatoid arthritis  2. History of juvenile rheumatoid arthritis  3. High risk medication use    Patient presents today for follow-up regarding her seronegative rheumatoid arthritis.  She tolerated the sulfasalazine but only had very minor improvement in combination with the Enbrel.  She elected to not refill the sulfasalazine as the improvement was so mild.  She also completed her last Enbrel over a week ago and would like to change treatment.  She is having stiffness in the mornings particularly if she is been more active.  Physical examination today did not show any active synovitis over her MCPs or PIPs.  She has full fist formation.  Previous laboratory evaluations were reviewed, results below.      As the Enbrel was no longer providing her benefit and the combination of Enbrel sulfasalazine is not effective we will change her treatment.  She will continue to hold the sulfasalazine.  We will start on Humira.  Side effects of this medication were reviewed with the patient today.  Will check labs today as a baseline to make sure that her liver function has returned to normal.  She will follow-up with me in 3 months, sooner if needed.        Plan:     Schedule follow-up with Sagrario Stone PA-C in 3 months.   Labs:  CBC, creatinine, albumin, AST, and ALT, CRP and Sed rate today   Medication recommendations:  Start Humira 40mg/0.4mL SubQ every 14 days  # Adalimumab (Humira) Risks and Benefits: The risks and benefits of adalimumab were discussed in detail and the patient verbalized understanding.  The risks discussed include, but are not limited to, the risk for hypersensitivity, anaphylaxis, anaphylactoid reactions, an increased risk  for serious infections leading to hospitalization or death, a possible increased risk for lymphoma and other malignancies, a possible worsening of demyelinating diseases, a possible worsening of heart failure, risk for cytopenias, risk for drug induced lupus, possible reactivation of hepatitis B, and possible reactivation of latent tuberculosis.  Subcutaneous injections may result in injection site reactions and/or pain at the site of injection.  The most common adverse reactions are infections, injection site reactions, headache, and rash.  It was discussed that the medication would need to be discontinued if a serious infection develops.  It was discussed that live vaccinations should not be received while using adalimumab or within 30 days prior to starting adalimumab.  I encouraged reviewing the package insert and asking any questions about the medication.     Sagrario Stone, [facility]  Rheumatology         History of Present Illness:   Kailyn Diop presents for evaluation of ankle/foot pain. 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     Rheumatological history:  Previous medications tried: Methotrexate (SubQ) (felt ill), plaquenil, Prednisone (prednisone) and sulinda, Enbrel (initially effective, then on restart not effective), Sulfasalazine (not effective in combination with Enbrel)  Current medications: none    Interval history January 4, 2024:  She had noted some improvement with the Sulfasalazine but not a significant amount. She ran out of Enbrel 1 week ago and is interested in changing treatment. She has stiffness in the mornings, particularly if she was more active the day before. She has not noticed any swelling. She has not had any infections. No personal history of stroke or heart attack.       Interval history September 25, 2023:  The enbrel is going okay. She states that she is not pain free and the pain is more severe than she expected. She is unsure what the cause of the pain. She did have pain free days  after the steroid injections at [facility]. Most of her pain is in her left ankle. Wrist is doing okay as are the knees.     Interval history June 29, 2023:  She states that the Prednisone was quite helpful.She would like to go back on Enbrel as it worked well for her in the past.  Unfortunately now with being off of the prednisone she has started to have an increase in her joint symptoms.    HPI from Consult:  She reports that she stopped treating arthritis around age 18. She has had 3 pregnancies in the last 6 years. She states that with the added weight of pregnancy, she would have increased pain in her foot and ankles. She was seen by [facility]. A CT scan was done to see if the tendons were the cause. She was told there was arthritis and she should see Rheum. She was given cortisone injections and states that this was very helpful. She states that the pain is starting to come back. It is not at the level that it was at before. She has some swelling around the ball of her ankle. She states that she has some wrist issues as well as she is unable to bear too much weight on them. She states that her ankles have always been stiff. She has some in her fingers as well. She feels that her joints feel similar to what they did when she was a child. No oral prednisone recently.No skin rashes or mouth sores. No hair loss. No history of pericarditis or pleuritis. No cancer history.     For her DAKSHA, she had issues with her ankles, knees and wrists. She had reconstructive jaw surgery due to the activity in her jaw. She states that the last thing she was on was Enbrel. She states that she was on Methotrexate (SubQ), plaquenil, Prednisone and sulindac. No allergic reactions to these medications.          Review of Systems:     Constitutional: negative  Skin: negative  Eyes: negative  Ears/Nose/Throat: negative  Respiratory: No shortness of breath, dyspnea on exertion, cough, or hemoptysis  Cardiovascular: negative  Gastrointestinal:  negative  Genitourinary: negative  Musculoskeletal: as above  Neurologic: negative  Psychiatric: negative  Hematologic/Lymphatic/Immunologic: negative  Endocrine: negative         Active Problem List:   There are no problems to display for this patient.           Past Medical History:   No past medical history on file.  No past surgical history on file.         Social History:     Social History     Socioeconomic History    Marital status:      Spouse name: Not on file    Number of children: Not on file    Years of education: Not on file    Highest education level: Not on file   Occupational History    Not on file   Tobacco Use    Smoking status: Former     Types: Cigarettes    Smokeless tobacco: Never   Substance and Sexual Activity    Alcohol use: Not on file    Drug use: Not on file    Sexual activity: Not on file   Other Topics Concern    Not on file   Social History Narrative    Not on file     Social Determinants of Health     Financial Resource Strain: Not on file   Food Insecurity: Not on file   Transportation Needs: Not on file   Physical Activity: Not on file   Stress: Not on file   Social Connections: Not on file   Interpersonal Safety: Not on file   Housing Stability: Not on file          Family History:   No family history on file.         Allergies:   No Known Allergies         Medications:     Current Outpatient Medications   Medication Sig Dispense Refill    adalimumab (HUMIRA *CF*) 40 MG/0.4ML pen kit Inject 0.4 mLs (40 mg) Subcutaneous every 14 days 0.8 mL 2    medroxyPROGESTERone (DEPO-PROVERA) 150 MG/ML IM injection Inject 150 mg into the muscle      sulfaSALAzine (AZULFIDINE) 500 MG tablet Take 1 tablet daily for 1 week, then take 1 tablet twice daily for 1 week, then take 1 tab in AM and 2 tabs in PM for 1 week, then take 2 tabs twice daily. Take this medication with food. 120 tablet 2    tretinoin (RETIN-A) 0.025 % external cream       buPROPion (WELLBUTRIN XL) 150 MG 24 hr tablet Take  150 mg by mouth daily      etanercept (ENBREL SURECLICK) 50 MG/ML autoinjector Inject 50 mg Subcutaneous once a week Hold for signs of infection, and seek medical attention. (Patient not taking: Reported on 1/4/2024) 4 mL 2    predniSONE (DELTASONE) 5 MG tablet Take 10 mg (2 tablets) daily x1 week, then take 7.5 mg (1.5 tablets) daily x1 week, then take 5 mg (1 tablet) daily x1 week then stop.  Take with food. (Patient not taking: Reported on 1/4/2024) 32 tablet 0    spironolactone (ALDACTONE) 25 MG tablet Take 1 tablet by mouth daily (Patient not taking: Reported on 1/4/2024)      valACYclovir (VALTREX) 1000 mg tablet Take 1,000 mg by mouth (Patient not taking: Reported on 1/4/2024)              Physical Exam:   Blood pressure 102/68, pulse 64, weight 67.3 kg (148 lb 4.8 oz), SpO2 97%.  Wt Readings from Last 6 Encounters:   01/04/24 67.3 kg (148 lb 4.8 oz)   09/25/23 66.6 kg (146 lb 12.8 oz)   01/17/23 66.7 kg (147 lb)     Constitutional: well-developed, appearing stated age; cooperative  Eyes: nl conjunctiva, sclera  ENT: nl external ears, nose, hearing, lips  Neck: no visible mass or thyroid enlargement  Resp: no shortness of breath with normal conversation  MSK:  No synovitis over the MCPs or PIPs bilaterally, full fist formation.  Psych: nl judgement, orientation, memory, affect.           Data:   Imaging:  CT Bilateral Ankles  Impression:  1. Bilateral subtalar and talonavicular arthritis left greater than right  2. Subtle cavovarus foot deformity bilaterally  3. Bilateral peroneal tendinitis left greater than right  4. Bilateral gastrocnemius contractures    X-ray bilateral hands and wrist 1/17/2023  IMPRESSION:  1.  Right hand and wrist: Normal joint spacing and alignment. No  periarticular erosion. No fracture.  2.  Left hand and wrist: Normal joint spacing and alignment. No  periarticular erosion. No fracture.    Laboratory:  1/17/2023  Creatinine 0.65, GFR greater than 90  Albumin 4.8  ALT 17, AST 20  CRP  less than 3.0  CCP antibody 1.6  Rheumatoid factor less than 7  White blood cell count 6.8, hemoglobin 13.4, plate count 171  Sed rate 10      6/29/2023  TB negative  Hep B and Hep non-reactive     10/23/2023  Creatinine 0.65, GFR greater than 90  Albumin 4.9  ALT 53, AST 43  CRP less than 3.0  White blood cell count 4.9, hemoglobin 12.5, platelets 176  Sed rate 11

## 2024-01-04 NOTE — PATIENT INSTRUCTIONS
After Visit Instructions:     Thank you for coming to [facility] for your care. It is my goal to partner with you to help you reach your optimal state of health.       Plan:     Schedule follow-up with Sagrario Stone PA-C in 3 months.   Labs:  CBC, creatinine, albumin, AST, and ALT, CRP and Sed rate today   Medication recommendations:  Start Humira 40mg/0.4mL SubQ every 14 days  # Adalimumab (Humira) Risks and Benefits: The risks and benefits of adalimumab were discussed in detail and the patient verbalized understanding.  The risks discussed include, but are not limited to, the risk for hypersensitivity, anaphylaxis, anaphylactoid reactions, an increased risk for serious infections leading to hospitalization or death, a possible increased risk for lymphoma and other malignancies, a possible worsening of demyelinating diseases, a possible worsening of heart failure, risk for cytopenias, risk for drug induced lupus, possible reactivation of hepatitis B, and possible reactivation of latent tuberculosis.  Subcutaneous injections may result in injection site reactions and/or pain at the site of injection.  The most common adverse reactions are infections, injection site reactions, headache, and rash.  It was discussed that the medication would need to be discontinued if a serious infection develops.  It was discussed that live vaccinations should not be received while using adalimumab or within 30 days prior to starting adalimumab.  I encouraged reviewing the package insert and asking any questions about the medication.            Sagrario Stone PA-C  [facility]  [facility]    Contact information: [facility]  Clinic Number:  [phone]  Please call or send a [employer] message with any questions about your care

## 2024-01-04 NOTE — TELEPHONE ENCOUNTER
PA Initiation    Medication: HUMIRA *CF* PEN 40 MG/0.4ML SC PNKT  Insurance Company: [employer] ([employer]) - Phone [phone] Fax [phone]  Pharmacy Filling the Rx: [facility] - [place], [place] - [address]  Filling Pharmacy Phone: [phone]  Filling Pharmacy Fax: [phone]  Start Date: 1/4/2024  ARIEL (Key: [id number])          Thank You,     Belen Burns [facility]  Specialty [facility]acy [facility]  [email or web address]  Phone: [phone]  Fax: [phone]

## 2024-01-05 NOTE — TELEPHONE ENCOUNTER
Prior Authorization Approval    Medication: HUMIRA *CF* PEN 40 MG/0.4ML SC PNKT  Authorization Effective Date: 1/4/2024  Authorization Expiration Date: 7/4/2024  Approved Dose/Quantity: 2 / 28  Reference #: ARIEL (Key: [id number])   Insurance Company: [employer] ([facility]) - Phone [phone] Fax [phone]  Expected CoPay: $ 5  CoPay Card Available: Other (see comments)    Financial Assistance Needed: [email or web address]  Which Pharmacy is filling the prescription: [facility] - [address]  Pharmacy Notified: yes  Patient Notified: yes - nikkie        Thank You,     Wandy Burns [facility]  Specialty Pharmacy Clinic [facility] Specialty  [email or web address]  [email or web address]  Phone: [phone]  Fax: [phone]

## 2024-03-14 DIAGNOSIS — M06.09 SERONEGATIVE RHEUMATOID ARTHRITIS OF MULTIPLE SITES (H): ICD-10-CM

## 2024-03-14 DIAGNOSIS — Z87.39 HISTORY OF JUVENILE RHEUMATOID ARTHRITIS: ICD-10-CM

## 2024-03-14 DIAGNOSIS — Z79.899 HIGH RISK MEDICATION USE: ICD-10-CM

## 2024-04-11 ENCOUNTER — OFFICE VISIT (OUTPATIENT)
Dept: RHEUMATOLOGY | Facility: CLINIC | Age: 39
End: 2024-04-11
Payer: COMMERCIAL

## 2024-04-11 VITALS
HEART RATE: 67 BPM | WEIGHT: 147.71 LBS | DIASTOLIC BLOOD PRESSURE: 62 MMHG | BODY MASS INDEX: 22.46 KG/M2 | SYSTOLIC BLOOD PRESSURE: 102 MMHG | OXYGEN SATURATION: 98 %

## 2024-04-11 DIAGNOSIS — M06.09 SERONEGATIVE RHEUMATOID ARTHRITIS OF MULTIPLE SITES (H): Primary | ICD-10-CM

## 2024-04-11 DIAGNOSIS — Z87.39 HISTORY OF JUVENILE RHEUMATOID ARTHRITIS: ICD-10-CM

## 2024-04-11 DIAGNOSIS — Z79.899 HIGH RISK MEDICATION USE: ICD-10-CM

## 2024-04-11 PROCEDURE — 99214 OFFICE O/P EST MOD 30 MIN: CPT | Performed by: PHYSICIAN ASSISTANT

## 2024-04-11 RX ORDER — HYDROXYCHLOROQUINE SULFATE 200 MG/1
TABLET, FILM COATED ORAL
Qty: 135 TABLET | Refills: 2 | Status: SHIPPED | OUTPATIENT
Start: 2024-04-11 | End: 2024-07-11

## 2024-04-11 NOTE — PROGRESS NOTES
Rheumatology Clinic Visit  [facility]  ESSENCE Durán     Kailyn Diop MRN# [id number]   YOB: 1985 Age: 38 year old   Date of Visit: 04/11/2024  Primary care provider: No primary care provider on file.          Assessment and Plan:     1. Seronegative Rheumatoid arthritis  2. History of juvenile rheumatoid arthritis  3. High risk medication use    Patient presents today for follow-up regarding her seronegative rheumatoid arthritis.  She does notice some benefit with the Humira but does report that she has some persistent pain in her joints, particularly her feet.  She has been told in the past by orthopedics that she may need a fusion in her foot but she is trying to hold off on that.  She tolerates the Humira well.  No side effects.    Physical examination today did not show any active synovitis over her MCPs or PIPs.  She has full fist formation.  Previous laboratory evaluations were reviewed, results below.      Plan to be to continue on the Humira.  She is been on hydroxychloroquine in the past and does not recall any side effects, therefore we will add that to her regimen.  Will get labs in 1 month.  Follow-up with me in 3 months.  If no additional benefit from the hydroxychloroquine is noted, could consider leflunomide.      Plan:     Schedule follow-up with Sagrario Stone PA-C in 3 months.   Labs:  CBC, creatinine, albumin, AST, and ALT, CRP and Sed rate in 1 month  Medication recommendations:  Continue Humira 40mg/0.4mL SubQ every 14 days  Start Hydroxychloroquine 200mg: take 1.5 tablets (300mg) daily    ESSENCE Durán  Rheumatology         History of Present Illness:   Kailyn Diop presents for evaluation of ankle/foot pain. 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     Rheumatological history:  Previous medications tried: Methotrexate (SubQ) (felt ill), plaquenil, Prednisone (prednisone) and sulindac, Enbrel (initially effective, then on restart not effective),  Sulfasalazine (not effective in combination with Enbrel)  Current medications: Humira every 14 days    Interval history April 11, 2024:  She has more pain in her feet today due to working last night. She has more pain in her left foot that is consistent. She has had a CT of her foot with [place] and was told that there is damage and she would need a surgery. She is trying a low inflammation diet and does notice improvement overall in her body.     Interval history January 4, 2024:  She had noted some improvement with the Sulfasalazine but not a significant amount. She ran out of Enbrel 1 week ago and is interested in changing treatment. She has stiffness in the mornings, particularly if she was more active the day before. She has not noticed any swelling. She has not had any infections. No personal history of stroke or heart attack.       Interval history September 25, 2023:  The enbrel is going okay. She states that she is not pain free and the pain is more severe than she expected. She is unsure what the cause of the pain. She did have pain free days after the steroid injections at [place]. Most of her pain is in her left ankle. Wrist is doing okay as are the knees.     Interval history June 29, 2023:  She states that the Prednisone was quite helpful.She would like to go back on Enbrel as it worked well for her in the past.  Unfortunately now with being off of the prednisone she has started to have an increase in her joint symptoms.    HPI from Consult:  She reports that she stopped treating arthritis around age 18. She has had 3 pregnancies in the last 6 years. She states that with the added weight of pregnancy, she would have increased pain in her foot and ankles. She was seen by [place]. A CT scan was done to see if the tendons were the cause. She was told there was arthritis and she should see Rheum. She was given cortisone injections and states that this was very helpful. She states that the pain is starting to  come back. It is not at the level that it was at before. She has some swelling around the ball of her ankle. She states that she has some wrist issues as well as she is unable to bear too much weight on them. She states that her ankles have always been stiff. She has some in her fingers as well. She feels that her joints feel similar to what they did when she was a child. No oral prednisone recently.No skin rashes or mouth sores. No hair loss. No history of pericarditis or pleuritis. No cancer history.     For her DAKSHA, she had issues with her ankles, knees and wrists. She had reconstructive jaw surgery due to the activity in her jaw. She states that the last thing she was on was Enbrel. She states that she was on Methotrexate (SubQ), plaquenil, Prednisone and sulindac. No allergic reactions to these medications.          Review of Systems:     Constitutional: negative  Skin: negative  Eyes: negative  Ears/Nose/Throat: negative  Respiratory: No shortness of breath, dyspnea on exertion, cough, or hemoptysis  Cardiovascular: negative  Gastrointestinal: negative  Genitourinary: negative  Musculoskeletal: as above  Neurologic: negative  Psychiatric: negative  Hematologic/Lymphatic/Immunologic: negative  Endocrine: negative         Active Problem List:   There are no problems to display for this patient.           Past Medical History:   No past medical history on file.  No past surgical history on file.         Social History:     Social History     Socioeconomic History    Marital status:      Spouse name: Not on file    Number of children: Not on file    Years of education: Not on file    Highest education level: Not on file   Occupational History    Not on file   Tobacco Use    Smoking status: Former     Types: Cigarettes    Smokeless tobacco: Never   Substance and Sexual Activity    Alcohol use: Not on file    Drug use: Not on file    Sexual activity: Not on file   Other Topics Concern    Not on file   Social  History Narrative    Not on file     Social Determinants of Health     Financial Resource Strain: Not on file   Food Insecurity: Not on file   Transportation Needs: Not on file   Physical Activity: Not on file   Stress: Not on file   Social Connections: Not on file   Interpersonal Safety: Not on file   Housing Stability: Not on file          Family History:   No family history on file.         Allergies:   No Known Allergies         Medications:     Current Outpatient Medications   Medication Sig Dispense Refill    adalimumab (HUMIRA *CF*) 40 MG/0.4ML pen kit Inject 0.4 mLs (40 mg) Subcutaneous every 14 days 0.8 mL 1    buPROPion (WELLBUTRIN XL) 150 MG 24 hr tablet Take 150 mg by mouth daily      medroxyPROGESTERone (DEPO-PROVERA) 150 MG/ML IM injection Inject 150 mg into the muscle      tretinoin (RETIN-A) 0.025 % external cream       sulfaSALAzine (AZULFIDINE) 500 MG tablet Take 1 tablet daily for 1 week, then take 1 tablet twice daily for 1 week, then take 1 tab in AM and 2 tabs in PM for 1 week, then take 2 tabs twice daily. Take this medication with food. (Patient not taking: Reported on 4/11/2024) 120 tablet 2            Physical Exam:   Blood pressure 102/62, pulse 67, weight 67 kg (147 lb 11.3 oz), SpO2 98%.  Wt Readings from Last 6 Encounters:   04/11/24 67 kg (147 lb 11.3 oz)   01/04/24 67.3 kg (148 lb 4.8 oz)   09/25/23 66.6 kg (146 lb 12.8 oz)   01/17/23 66.7 kg (147 lb)     Constitutional: well-developed, appearing stated age; cooperative  Eyes: nl conjunctiva, sclera  ENT: nl external ears, nose, hearing, lips  Neck: no visible mass or thyroid enlargement  Resp: no shortness of breath with normal conversation  MSK:  No synovitis over the MCPs or PIPs bilaterally, no knee or elbow effusions.  Psych: nl judgement, orientation, memory, affect.           Data:   Imaging:  CT Bilateral Ankles  Impression:  1. Bilateral subtalar and talonavicular arthritis left greater than right  2. Subtle cavovarus foot  deformity bilaterally  3. Bilateral peroneal tendinitis left greater than right  4. Bilateral gastrocnemius contractures    X-ray bilateral hands and wrist 1/17/2023  IMPRESSION:  1.  Right hand and wrist: Normal joint spacing and alignment. No  periarticular erosion. No fracture.  2.  Left hand and wrist: Normal joint spacing and alignment. No  periarticular erosion. No fracture.    Laboratory:  1/17/2023  Creatinine 0.65, GFR greater than 90  Albumin 4.8  ALT 17, AST 20  CRP less than 3.0  CCP antibody 1.6  Rheumatoid factor less than 7  White blood cell count 6.8, hemoglobin 13.4, plate count 171  Sed rate 10      6/29/2023  TB negative  Hep B and Hep non-reactive     10/23/2023  Creatinine 0.65, GFR greater than 90  Albumin 4.9  ALT 53, AST 43  CRP less than 3.0  White blood cell count 4.9, hemoglobin 12.5, platelets 176  Sed rate 11    1/4/24  Creatinine 0.64, GFR >90  Albumin 4.7   ALT 49, AST 25  WBC 6.2, Hgb 13.7, Plt 174

## 2024-04-11 NOTE — PATIENT INSTRUCTIONS
After Visit Instructions:     Thank you for coming to [facility] Rheumatology for your care. It is my goal to partner with you to help you reach your optimal state of health.       Plan:     Schedule follow-up with Sagrario Stone PA-C in 3 months.   Labs:  CBC, creatinine, albumin, AST, and ALT, CRP and Sed rate in 1 month  Medication recommendations:  Continue Humira 40mg/0.4mL SubQ every 14 days  Start Hydroxychloroquine 200mg: take 1.5 tablets (300mg) daily      Sagrario Stone PA-C  [facility] Rheumatology  [facility]    Contact information: [facility]umatology  Clinic Number:  [phone]  Please call or send a [employer] message with any questions about your care

## 2024-05-09 ENCOUNTER — LAB (OUTPATIENT)
Dept: LAB | Facility: CLINIC | Age: 39
End: 2024-05-09
Payer: COMMERCIAL

## 2024-05-09 DIAGNOSIS — M06.09 SERONEGATIVE RHEUMATOID ARTHRITIS OF MULTIPLE SITES (H): ICD-10-CM

## 2024-05-09 DIAGNOSIS — Z79.899 HIGH RISK MEDICATION USE: ICD-10-CM

## 2024-05-09 DIAGNOSIS — Z87.39 HISTORY OF JUVENILE RHEUMATOID ARTHRITIS: ICD-10-CM

## 2024-05-09 LAB
ALBUMIN SERPL BCG-MCNC: 4.5 G/DL (ref 3.5–5.2)
ALT SERPL W P-5'-P-CCNC: 18 U/L (ref 0–50)
AST SERPL W P-5'-P-CCNC: 20 U/L (ref 0–45)
CREAT SERPL-MCNC: 0.63 MG/DL (ref 0.51–0.95)
EGFRCR SERPLBLD CKD-EPI 2021: >90 ML/MIN/1.73M2
ERYTHROCYTE [DISTWIDTH] IN BLOOD BY AUTOMATED COUNT: 13.1 % (ref 10–15)
HCT VFR BLD AUTO: 37.3 % (ref 35–47)
HGB BLD-MCNC: 12.6 G/DL (ref 11.7–15.7)
MCH RBC QN AUTO: 31.5 PG (ref 26.5–33)
MCHC RBC AUTO-ENTMCNC: 33.8 G/DL (ref 31.5–36.5)
MCV RBC AUTO: 93 FL (ref 78–100)
PLATELET # BLD AUTO: 173 10E3/UL (ref 150–450)
RBC # BLD AUTO: 4 10E6/UL (ref 3.8–5.2)
WBC # BLD AUTO: 8 10E3/UL (ref 4–11)

## 2024-05-09 PROCEDURE — 82565 ASSAY OF CREATININE: CPT

## 2024-05-09 PROCEDURE — 36415 COLL VENOUS BLD VENIPUNCTURE: CPT

## 2024-05-09 PROCEDURE — 84450 TRANSFERASE (AST) (SGOT): CPT

## 2024-05-09 PROCEDURE — 82040 ASSAY OF SERUM ALBUMIN: CPT

## 2024-05-09 PROCEDURE — 85027 COMPLETE CBC AUTOMATED: CPT

## 2024-05-09 PROCEDURE — 84460 ALANINE AMINO (ALT) (SGPT): CPT

## 2024-06-10 ENCOUNTER — TELEPHONE (OUTPATIENT)
Dept: RHEUMATOLOGY | Facility: CLINIC | Age: 39
End: 2024-06-10
Payer: COMMERCIAL

## 2024-06-10 NOTE — TELEPHONE ENCOUNTER
PA Initiation    Medication: HUMIRA *CF* PEN 40 MG/0.4ML SC PNKT  Insurance Company: [employer] ([facility]) - Phone [phone] Fax [phone]  Pharmacy Filling the Rx: [facility] - [place], [place] - [address]  Filling Pharmacy Phone: [phone]  Filling Pharmacy Fax: [phone]  Start Date: 6/10/2024  ARIEL (Key: [id number])  PA Case ID #: [id number]    [email or web address]        Thank You,     Belen Burns [facility]  Specialty Pharmacy Clinic [facility] Special[facility]  [email or web address]  [email or web address]  Phone: [phone]  Fax: [phone]

## 2024-06-11 NOTE — TELEPHONE ENCOUNTER
Prior Authorization Approval    Medication: HUMIRA *CF* PEN 40 MG/0.4ML SC PNKT  Authorization Effective Date: 6/10/2024  Authorization Expiration Date: 6/10/2025  Approved Dose/Quantity: 2 kit / 28 day  Reference #: ARIEL (Key: [id number]   Insurance Company: [employer] ([facility]) - Phone [phone] Fax [phone]  Expected CoPay: $    CoPay Card Available: Other (see comments)    Financial Assistance Needed: [email or web address]  Which Pharmacy is filling the prescription: [facility] - [address]  Pharmacy Notified: yes - ePA  Patient Notified: renewal        Thank You,     Belen Burns [facility]  Specialty Pharmacy Clinic [facility] Specialty  [email or web address]  [email or web address]  Phone: [phone]  Fax: [phone]

## 2024-06-27 ENCOUNTER — TELEPHONE (OUTPATIENT)
Dept: RHEUMATOLOGY | Facility: CLINIC | Age: 39
End: 2024-06-27
Payer: COMMERCIAL

## 2024-06-27 DIAGNOSIS — M06.09 SERONEGATIVE RHEUMATOID ARTHRITIS OF MULTIPLE SITES (H): ICD-10-CM

## 2024-06-27 DIAGNOSIS — Z79.899 HIGH RISK MEDICATION USE: ICD-10-CM

## 2024-06-27 DIAGNOSIS — Z87.39 HISTORY OF JUVENILE RHEUMATOID ARTHRITIS: ICD-10-CM

## 2024-06-27 NOTE — TELEPHONE ENCOUNTER
[facility]    Refill request    Humira cf pen 40/mg/0.4ml    Inject 40mg subcutaneouly every 2 weeks.    Refills    Ov 7/11/24  Last ov 4/11/24    Last lab 5/9/24

## 2024-07-11 ENCOUNTER — OFFICE VISIT (OUTPATIENT)
Dept: RHEUMATOLOGY | Facility: CLINIC | Age: 39
End: 2024-07-11
Payer: COMMERCIAL

## 2024-07-11 VITALS
WEIGHT: 149.6 LBS | SYSTOLIC BLOOD PRESSURE: 124 MMHG | DIASTOLIC BLOOD PRESSURE: 76 MMHG | OXYGEN SATURATION: 98 % | HEART RATE: 59 BPM | BODY MASS INDEX: 22.75 KG/M2

## 2024-07-11 DIAGNOSIS — Z87.39 HISTORY OF JUVENILE RHEUMATOID ARTHRITIS: ICD-10-CM

## 2024-07-11 DIAGNOSIS — Z79.899 HIGH RISK MEDICATION USE: ICD-10-CM

## 2024-07-11 DIAGNOSIS — M06.09 SERONEGATIVE RHEUMATOID ARTHRITIS OF MULTIPLE SITES (H): Primary | ICD-10-CM

## 2024-07-11 PROCEDURE — 99214 OFFICE O/P EST MOD 30 MIN: CPT | Performed by: PHYSICIAN ASSISTANT

## 2024-07-11 RX ORDER — HYDROXYCHLOROQUINE SULFATE 200 MG/1
TABLET, FILM COATED ORAL
Qty: 135 TABLET | Refills: 2 | Status: SHIPPED | OUTPATIENT
Start: 2024-07-11

## 2024-07-11 RX ORDER — BUPROPION HYDROCHLORIDE 300 MG/1
300 TABLET ORAL DAILY
COMMUNITY
Start: 2024-04-25

## 2024-07-11 NOTE — PATIENT INSTRUCTIONS
After Visit Instructions:     Thank you for coming to [facility] Rheumatology for your care. It is my goal to partner with you to help you reach your optimal state of health.       Plan:     Schedule follow-up with Sagrario Stone PA-C in 3 months.   Labs:  CBC, creatinine, albumin, AST, and ALT every 3 months-next due at the beginning of August  Medication recommendations:  Continue Humira 40mg/0.4mL SubQ every 14 days  Continue Hydroxychloroquine 200mg: take 1.5 tablets (300mg) daily      Sagrario Stone PA-C  [facility]  [facility]    Contact information: [facility]  Clinic Number:  [phone]  Please call or send a [employer] message with any questions about your care   
I will STOP taking the medications listed below when I get home from the hospital:  None

## 2024-07-11 NOTE — PROGRESS NOTES
Rheumatology Clinic Visit  [facility]  ESSENCE Durán     Kailyn Diop MRN# [id number]   YOB: 1985 Age: 38 year old   Date of Visit: 07/11/2024  Primary care provider: No primary care provider on file.          Assessment and Plan:     1. Seronegative Rheumatoid arthritis  2. History of juvenile rheumatoid arthritis  3. High risk medication use    Patient presents today for follow-up regarding her seronegative rheumatoid arthritis.  She thought that yesterday she had noticed improvement with the addition of the hydroxychloroquine but she worked last night and states that today she is having more pain.  She has tolerated both medications without any side effects.  Physical examination today does not show any active synovitis over her MCPs or PIPs and there is no tenderness.  She does have full fist formation.  We discussed different options today including continuing current regimen versus changing the hydroxychloroquine to leflunomide.  At this time she would like to continue on her current regimen for another 3 months to see how things go.  Will check labs every 3 months to monitor for any medication toxicity.  She will follow-up with me in 3 months, sooner if needed.      Plan:     Schedule follow-up with Sagrario Stone PA-C in 3 months.   Labs:  CBC, creatinine, albumin, AST, and ALT every 3 months-next due at the beginning of August  Medication recommendations:  Continue Humira 40mg/0.4mL SubQ every 14 days  Continue Hydroxychloroquine 200mg: take 1.5 tablets (300mg) daily    ESSENCE Durán  Rheumatology         History of Present Illness:   Kailyn Diop presents for evaluation of ankle/foot pain. 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     Rheumatological history:  Previous medications tried: Methotrexate (SubQ) (felt ill), plaquenil, Prednisone (prednisone) and sulindac, Enbrel (initially effective, then on restart not effective), Sulfasalazine (not effective in  combination with Enbrel)  Current medications: Humira every 14 days    Interval history July 11, 2024:  She thinks that yesterday she was feeling better, but she worked yesterday and feels increased symptoms today. She tolerates the Hydroxychloroquine well. No side effects.     Interval history April 11, 2024:  She has more pain in her feet today due to working last night. She has more pain in her left foot that is consistent. She has had a CT of her foot with [place] and was told that there is damage and she would need a surgery. She is trying a low inflammation diet and does notice improvement overall in her body.     Interval history January 4, 2024:  She had noted some improvement with the Sulfasalazine but not a significant amount. She ran out of Enbrel 1 week ago and is interested in changing treatment. She has stiffness in the mornings, particularly if she was more active the day before. She has not noticed any swelling. She has not had any infections. No personal history of stroke or heart attack.       Interval history September 25, 2023:  The enbrel is going okay. She states that she is not pain free and the pain is more severe than she expected. She is unsure what the cause of the pain. She did have pain free days after the steroid injections at [place]. Most of her pain is in her left ankle. Wrist is doing okay as are the knees.     Interval history June 29, 2023:  She states that the Prednisone was quite helpful.She would like to go back on Enbrel as it worked well for her in the past.  Unfortunately now with being off of the prednisone she has started to have an increase in her joint symptoms.    HPI from Consult:  She reports that she stopped treating arthritis around age 18. She has had 3 pregnancies in the last 6 years. She states that with the added weight of pregnancy, she would have increased pain in her foot and ankles. She was seen by [place]. A CT scan was done to see if the tendons were the  cause. She was told there was arthritis and she should see Rheum. She was given cortisone injections and states that this was very helpful. She states that the pain is starting to come back. It is not at the level that it was at before. She has some swelling around the ball of her ankle. She states that she has some wrist issues as well as she is unable to bear too much weight on them. She states that her ankles have always been stiff. She has some in her fingers as well. She feels that her joints feel similar to what they did when she was a child. No oral prednisone recently.No skin rashes or mouth sores. No hair loss. No history of pericarditis or pleuritis. No cancer history.     For her DAKSHA, she had issues with her ankles, knees and wrists. She had reconstructive jaw surgery due to the activity in her jaw. She states that the last thing she was on was Enbrel. She states that she was on Methotrexate (SubQ), plaquenil, Prednisone and sulindac. No allergic reactions to these medications.          Review of Systems:     Constitutional: negative  Skin: negative  Eyes: negative  Ears/Nose/Throat: negative  Respiratory: No shortness of breath, dyspnea on exertion, cough, or hemoptysis  Cardiovascular: negative  Gastrointestinal: negative  Genitourinary: negative  Musculoskeletal: as above  Neurologic: negative  Psychiatric: negative  Hematologic/Lymphatic/Immunologic: negative  Endocrine: negative         Active Problem List:   There are no problems to display for this patient.           Past Medical History:   No past medical history on file.  No past surgical history on file.         Social History:     Social History     Socioeconomic History    Marital status:      Spouse name: Not on file    Number of children: Not on file    Years of education: Not on file    Highest education level: Not on file   Occupational History    Not on file   Tobacco Use    Smoking status: Former     Types: Cigarettes    Smokeless  tobacco: Never   Substance and Sexual Activity    Alcohol use: Not on file    Drug use: Not on file    Sexual activity: Not on file   Other Topics Concern    Not on file   Social History Narrative    Not on file     Social Determinants of Health     Financial Resource Strain: Not on file   Food Insecurity: Not on file   Transportation Needs: Not on file   Physical Activity: Not on file   Stress: Not on file   Social Connections: Not on file   Interpersonal Safety: Not on file   Housing Stability: Not on file          Family History:   No family history on file.         Allergies:   No Known Allergies         Medications:     Current Outpatient Medications   Medication Sig Dispense Refill    adalimumab (HUMIRA *CF*) 40 MG/0.4ML pen kit Inject 0.4 mLs (40 mg) Subcutaneous every 14 days 0.8 mL 2    buPROPion (WELLBUTRIN XL) 300 MG 24 hr tablet Take 300 mg by mouth daily      hydroxychloroquine (PLAQUENIL) 200 MG tablet Take 1.5 tablets (300mg) daily. Annual Plaquenil toxicity eye screening required. 135 tablet 2    tretinoin (RETIN-A) 0.025 % external cream       buPROPion (WELLBUTRIN XL) 150 MG 24 hr tablet Take 150 mg by mouth daily      medroxyPROGESTERone (DEPO-PROVERA) 150 MG/ML IM injection Inject 150 mg into the muscle              Physical Exam:   Blood pressure 124/76, pulse 59, weight 67.9 kg (149 lb 9.6 oz), SpO2 98%.  Wt Readings from Last 6 Encounters:   07/11/24 67.9 kg (149 lb 9.6 oz)   04/11/24 67 kg (147 lb 11.3 oz)   01/04/24 67.3 kg (148 lb 4.8 oz)   09/25/23 66.6 kg (146 lb 12.8 oz)   01/17/23 66.7 kg (147 lb)     Constitutional: well-developed, appearing stated age; cooperative  Eyes: nl conjunctiva, sclera  ENT: nl external ears, nose, hearing, lips  Neck: no visible mass or thyroid enlargement  Resp: no shortness of breath with normal conversation  MSK:  No synovitis over the MCPs or PIPs bilaterally, no knee or elbow effusions.  Psych: nl judgement, orientation, memory, affect.           Data:    Imaging:  CT Bilateral Ankles  Impression:  1. Bilateral subtalar and talonavicular arthritis left greater than right  2. Subtle cavovarus foot deformity bilaterally  3. Bilateral peroneal tendinitis left greater than right  4. Bilateral gastrocnemius contractures    X-ray bilateral hands and wrist 1/17/2023  IMPRESSION:  1.  Right hand and wrist: Normal joint spacing and alignment. No  periarticular erosion. No fracture.  2.  Left hand and wrist: Normal joint spacing and alignment. No  periarticular erosion. No fracture.    Laboratory:  1/17/2023  Creatinine 0.65, GFR greater than 90  Albumin 4.8  ALT 17, AST 20  CRP less than 3.0  CCP antibody 1.6  Rheumatoid factor less than 7  White blood cell count 6.8, hemoglobin 13.4, plate count 171  Sed rate 10      6/29/2023  TB negative  Hep B and Hep non-reactive     10/23/2023  Creatinine 0.65, GFR greater than 90  Albumin 4.9  ALT 53, AST 43  CRP less than 3.0  White blood cell count 4.9, hemoglobin 12.5, platelets 176  Sed rate 11    1/4/24  Creatinine 0.64, GFR >90  Albumin 4.7   ALT 49, AST 25  WBC 6.2, Hgb 13.7, Plt 174    5/9/24  Creatinine 0.63, GFR >90  Albumin 4.5  ALT 18, AST 20  WBC 8.0, Hgb 12.6, Plt 173

## 2024-08-12 ENCOUNTER — LAB (OUTPATIENT)
Dept: LAB | Facility: CLINIC | Age: 39
End: 2024-08-12
Payer: COMMERCIAL

## 2024-08-12 DIAGNOSIS — M06.09 SERONEGATIVE RHEUMATOID ARTHRITIS OF MULTIPLE SITES (H): ICD-10-CM

## 2024-08-12 DIAGNOSIS — Z87.39 HISTORY OF JUVENILE RHEUMATOID ARTHRITIS: ICD-10-CM

## 2024-08-12 DIAGNOSIS — Z79.899 HIGH RISK MEDICATION USE: ICD-10-CM

## 2024-08-12 LAB
ALBUMIN SERPL BCG-MCNC: 4.6 G/DL (ref 3.5–5.2)
ALT SERPL W P-5'-P-CCNC: 20 U/L (ref 0–50)
AST SERPL W P-5'-P-CCNC: 24 U/L (ref 0–45)
CREAT SERPL-MCNC: 0.7 MG/DL (ref 0.51–0.95)
EGFRCR SERPLBLD CKD-EPI 2021: >90 ML/MIN/1.73M2
ERYTHROCYTE [DISTWIDTH] IN BLOOD BY AUTOMATED COUNT: 12.4 % (ref 10–15)
HCT VFR BLD AUTO: 38.4 % (ref 35–47)
HGB BLD-MCNC: 12.9 G/DL (ref 11.7–15.7)
MCH RBC QN AUTO: 31.1 PG (ref 26.5–33)
MCHC RBC AUTO-ENTMCNC: 33.6 G/DL (ref 31.5–36.5)
MCV RBC AUTO: 93 FL (ref 78–100)
PLATELET # BLD AUTO: 168 10E3/UL (ref 150–450)
RBC # BLD AUTO: 4.15 10E6/UL (ref 3.8–5.2)
WBC # BLD AUTO: 4.5 10E3/UL (ref 4–11)

## 2024-08-12 PROCEDURE — 82565 ASSAY OF CREATININE: CPT

## 2024-08-12 PROCEDURE — 84460 ALANINE AMINO (ALT) (SGPT): CPT

## 2024-08-12 PROCEDURE — 84450 TRANSFERASE (AST) (SGOT): CPT

## 2024-08-12 PROCEDURE — 85027 COMPLETE CBC AUTOMATED: CPT

## 2024-08-12 PROCEDURE — 36415 COLL VENOUS BLD VENIPUNCTURE: CPT

## 2024-08-12 PROCEDURE — 82040 ASSAY OF SERUM ALBUMIN: CPT

## 2024-10-02 ENCOUNTER — TELEPHONE (OUTPATIENT)
Dept: RHEUMATOLOGY | Facility: CLINIC | Age: 39
End: 2024-10-02
Payer: COMMERCIAL

## 2024-10-02 DIAGNOSIS — Z87.39 HISTORY OF JUVENILE RHEUMATOID ARTHRITIS: ICD-10-CM

## 2024-10-02 DIAGNOSIS — M06.09 SERONEGATIVE RHEUMATOID ARTHRITIS OF MULTIPLE SITES (H): ICD-10-CM

## 2024-10-02 DIAGNOSIS — Z79.899 HIGH RISK MEDICATION USE: ICD-10-CM

## 2024-10-02 NOTE — TELEPHONE ENCOUNTER
[facility] Call Center    Phone Message    May a detailed message be left on voicemail: yes     Reason for Call: Medication Refill Request    Has the patient contacted the pharmacy for the refill? Yes   Name of medication being requested: adalimumab (HUMIRA *CF*) 40 MG/0.4ML pen kit   Provider who prescribed the medication: Sagrario Stone    Pharmacy:   [facility] - [address]     Date medication is needed: ASAP      Action Taken: Other: rheum      Travel Screening: Not Applicable     Date of Service:

## 2024-10-02 NOTE — TELEPHONE ENCOUNTER
At this time she would like to continue on her current regimen for another 3 months to see how things go. Will check labs every 3 months to monitor for any medication toxicity. She will follow-up with me in 3 months, sooner if needed.

## 2024-10-06 ENCOUNTER — HEALTH MAINTENANCE LETTER (OUTPATIENT)
Age: 39
End: 2024-10-06

## 2024-10-23 DIAGNOSIS — Z79.899 HIGH RISK MEDICATION USE: ICD-10-CM

## 2024-10-23 DIAGNOSIS — Z87.39 HISTORY OF JUVENILE RHEUMATOID ARTHRITIS: ICD-10-CM

## 2024-10-23 DIAGNOSIS — M06.09 SERONEGATIVE RHEUMATOID ARTHRITIS OF MULTIPLE SITES (H): ICD-10-CM

## 2024-10-23 NOTE — TELEPHONE ENCOUNTER
Pt needs follow up now and labs in November.   Pt was due for follow up in October.    Per notes from 7/11/24:  Schedule follow-up with Sagrario Stone PA-C in 3 months.   Labs:  CBC, creatinine, albumin, AST, and ALT every 3 months-next due at the beginning of August

## 2024-11-11 ENCOUNTER — LAB (OUTPATIENT)
Dept: LAB | Facility: CLINIC | Age: 39
End: 2024-11-11
Payer: COMMERCIAL

## 2024-11-11 DIAGNOSIS — Z79.899 HIGH RISK MEDICATION USE: ICD-10-CM

## 2024-11-11 DIAGNOSIS — Z87.39 HISTORY OF JUVENILE RHEUMATOID ARTHRITIS: ICD-10-CM

## 2024-11-11 DIAGNOSIS — M06.09 SERONEGATIVE RHEUMATOID ARTHRITIS OF MULTIPLE SITES (H): ICD-10-CM

## 2024-11-11 LAB
ALBUMIN SERPL BCG-MCNC: 4.7 G/DL (ref 3.5–5.2)
ALT SERPL W P-5'-P-CCNC: 18 U/L (ref 0–50)
AST SERPL W P-5'-P-CCNC: 28 U/L (ref 0–45)
CREAT SERPL-MCNC: 0.72 MG/DL (ref 0.51–0.95)
EGFRCR SERPLBLD CKD-EPI 2021: >90 ML/MIN/1.73M2
ERYTHROCYTE [DISTWIDTH] IN BLOOD BY AUTOMATED COUNT: 12.2 % (ref 10–15)
HCT VFR BLD AUTO: 40.1 % (ref 35–47)
HGB BLD-MCNC: 13.6 G/DL (ref 11.7–15.7)
MCH RBC QN AUTO: 31.3 PG (ref 26.5–33)
MCHC RBC AUTO-ENTMCNC: 33.9 G/DL (ref 31.5–36.5)
MCV RBC AUTO: 92 FL (ref 78–100)
PLATELET # BLD AUTO: 205 10E3/UL (ref 150–450)
RBC # BLD AUTO: 4.34 10E6/UL (ref 3.8–5.2)
WBC # BLD AUTO: 5.9 10E3/UL (ref 4–11)

## 2024-11-11 PROCEDURE — 36415 COLL VENOUS BLD VENIPUNCTURE: CPT

## 2024-11-11 PROCEDURE — 85027 COMPLETE CBC AUTOMATED: CPT

## 2024-11-11 PROCEDURE — 84450 TRANSFERASE (AST) (SGOT): CPT

## 2024-11-11 PROCEDURE — 82040 ASSAY OF SERUM ALBUMIN: CPT

## 2024-11-11 PROCEDURE — 84460 ALANINE AMINO (ALT) (SGPT): CPT

## 2024-11-11 PROCEDURE — 82565 ASSAY OF CREATININE: CPT

## 2024-11-12 NOTE — TELEPHONE ENCOUNTER
Received new Rx for Humira syringe. PA on file is for Humira pens. Just want to confirm if this change was intentional before I submit a new PA to change to syringes.

## 2024-12-05 ENCOUNTER — OFFICE VISIT (OUTPATIENT)
Dept: RHEUMATOLOGY | Facility: CLINIC | Age: 39
End: 2024-12-05
Payer: COMMERCIAL

## 2024-12-05 VITALS
DIASTOLIC BLOOD PRESSURE: 78 MMHG | HEART RATE: 72 BPM | BODY MASS INDEX: 22.5 KG/M2 | SYSTOLIC BLOOD PRESSURE: 122 MMHG | WEIGHT: 148 LBS

## 2024-12-05 DIAGNOSIS — Z79.899 HIGH RISK MEDICATION USE: ICD-10-CM

## 2024-12-05 DIAGNOSIS — M06.09 SERONEGATIVE RHEUMATOID ARTHRITIS OF MULTIPLE SITES (H): Primary | ICD-10-CM

## 2024-12-05 DIAGNOSIS — Z87.39 HISTORY OF JUVENILE RHEUMATOID ARTHRITIS: ICD-10-CM

## 2024-12-05 ASSESSMENT — PATIENT HEALTH QUESTIONNAIRE - PHQ9: SUM OF ALL RESPONSES TO PHQ QUESTIONS 1-9: 14

## 2024-12-05 NOTE — PATIENT INSTRUCTIONS
After Visit Instructions:     Thank you for coming to [facility] for your care. It is my goal to partner with you to help you reach your optimal state of health.       Plan:     Schedule follow-up with Sagrario Stone PA-C in 3 months.   Labs: CBC, creatinine, Albumin, AST, ALT, CRP and Sed Rate every 6 months  Medication recommendations:   Continue Humira 40mg/0.4mL SubQ every 14 days  Stop Hydroxychloroquine     Sagrario Stone PA-C  [facility]  [facility]    Contact information: [facility]  Clinic Number:  [phone]  Please call or send a ivWatch message with any questions about your care

## 2024-12-05 NOTE — PROGRESS NOTES
Rheumatology Clinic Visit  [facility]  ESSENCE Durán     Kailyn Diop MRN# [id number]   YOB: 1985 Age: 38 year old   Date of Visit: 12/05/2024  Primary care provider: No primary care provider on file.          Assessment and Plan:     1. Seronegative Rheumatoid arthritis  2. History of juvenile rheumatoid arthritis  3. High risk medication use    Patient presents today for follow-up regarding her seronegative rheumatoid arthritis. Joints are doing quite well. She has had some increased eye twitching and numbness/burning on the palms of her hands. Been happening for about 2 weeks.  She is interested in stopping the hydroxychloroquine.  Okay for her to discontinue this.  She will continue on the Humira monotherapy.  Check labs every 6 months while on Humira.  Follow-up with me in 3 months, sooner if needed.  She will also work on increasing her fluid intake as well as electrolytes.      Plan:     Schedule follow-up with Sagrario Stone PA-C in 3 months.   Labs: CBC, creatinine, Albumin, AST, ALT, CRP and Sed Rate every 6 months  Medication recommendations:   Continue Humira 40mg/0.4mL SubQ every 14 days  Stop Hydroxychloroquine     ESSENCE Durán  Rheumatology         History of Present Illness:   Kailyn Diop presents for evaluation of ankle/foot pain. 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     Rheumatological history:  Previous medications tried: Methotrexate (SubQ) (felt ill), plaquenil, Prednisone (prednisone) and sulindac, Enbrel (initially effective, then on restart not effective), Sulfasalazine (not effective in combination with Enbrel)  Current medications: Humira every 14 days, hydroxychloroquine 300mg daily    Interval history December 5, 2024:  For almost 2 weeks, she has been having numbness in the palms of her hands and eye twitching and eye pressure. The eye twitching was significant yesterday.  It is not all day long, but she does notice it daily for 2 weeks.      Interval history July 11, 2024:  She thinks that yesterday she was feeling better, but she worked yesterday and feels increased symptoms today. She tolerates the Hydroxychloroquine well. No side effects.     Interval history April 11, 2024:  She has more pain in her feet today due to working last night. She has more pain in her left foot that is consistent. She has had a CT of her foot with [place] and was told that there is damage and she would need a surgery. She is trying a low inflammation diet and does notice improvement overall in her body.     Interval history January 4, 2024:  She had noted some improvement with the Sulfasalazine but not a significant amount. She ran out of Enbrel 1 week ago and is interested in changing treatment. She has stiffness in the mornings, particularly if she was more active the day before. She has not noticed any swelling. She has not had any infections. No personal history of stroke or heart attack.       Interval history September 25, 2023:  The enbrel is going okay. She states that she is not pain free and the pain is more severe than she expected. She is unsure what the cause of the pain. She did have pain free days after the steroid injections at [place]. Most of her pain is in her left ankle. Wrist is doing okay as are the knees.     Interval history June 29, 2023:  She states that the Prednisone was quite helpful.She would like to go back on Enbrel as it worked well for her in the past.  Unfortunately now with being off of the prednisone she has started to have an increase in her joint symptoms.    HPI from Consult:  She reports that she stopped treating arthritis around age 18. She has had 3 pregnancies in the last 6 years. She states that with the added weight of pregnancy, she would have increased pain in her foot and ankles. She was seen by [facility]. A CT scan was done to see if the tendons were the cause. She was told there was arthritis and she should see Rheum. She  was given cortisone injections and states that this was very helpful. She states that the pain is starting to come back. It is not at the level that it was at before. She has some swelling around the ball of her ankle. She states that she has some wrist issues as well as she is unable to bear too much weight on them. She states that her ankles have always been stiff. She has some in her fingers as well. She feels that her joints feel similar to what they did when she was a child. No oral prednisone recently.No skin rashes or mouth sores. No hair loss. No history of pericarditis or pleuritis. No cancer history.     For her DAKSHA, she had issues with her ankles, knees and wrists. She had reconstructive jaw surgery due to the activity in her jaw. She states that the last thing she was on was Enbrel. She states that she was on Methotrexate (SubQ), plaquenil, Prednisone and sulindac. No allergic reactions to these medications.          Review of Systems:     Constitutional: negative  Skin: negative  Eyes: negative  Ears/Nose/Throat: negative  Respiratory: No shortness of breath, dyspnea on exertion, cough, or hemoptysis  Cardiovascular: negative  Gastrointestinal: negative  Genitourinary: negative  Musculoskeletal: as above  Neurologic: negative  Psychiatric: negative  Hematologic/Lymphatic/Immunologic: negative  Endocrine: negative         Active Problem List:   There are no active problems to display for this patient.           Past Medical History:   No past medical history on file.  No past surgical history on file.         Social History:     Social History     Socioeconomic History    Marital status:      Spouse name: Not on file    Number of children: Not on file    Years of education: Not on file    Highest education level: Not on file   Occupational History    Not on file   Tobacco Use    Smoking status: Former     Types: Cigarettes    Smokeless tobacco: Never   Substance and Sexual Activity    Alcohol use:  Not on file    Drug use: Not on file    Sexual activity: Not on file   Other Topics Concern    Not on file   Social History Narrative    Not on file     Social Drivers of Health     Financial Resource Strain: Not on file   Food Insecurity: Not on file   Transportation Needs: Not on file   Physical Activity: Not on file   Stress: Not on file   Social Connections: Not on file   Interpersonal Safety: Not on file   Housing Stability: Not on file          Family History:   No family history on file.         Allergies:   No Known Allergies         Medications:     Current Outpatient Medications   Medication Sig Dispense Refill    Adalimumab (HUMIRA *CF*) 40 MG/0.4ML pen kit Inject 0.4 mLs (40 mg) subcutaneously every 14 days. Hold for signs of infection, then seek medical attention. 1 each 5    buPROPion (WELLBUTRIN XL) 300 MG 24 hr tablet Take 300 mg by mouth daily      hydroxychloroquine (PLAQUENIL) 200 MG tablet Take 1.5 tablets (300mg) daily. Annual Plaquenil toxicity eye screening required. 135 tablet 2    tretinoin (RETIN-A) 0.025 % external cream       buPROPion (WELLBUTRIN XL) 150 MG 24 hr tablet Take 150 mg by mouth daily      medroxyPROGESTERone (DEPO-PROVERA) 150 MG/ML IM injection Inject 150 mg into the muscle              Physical Exam:   Blood pressure 122/78, pulse 72, weight 67.1 kg (148 lb).  Wt Readings from Last 6 Encounters:   12/05/24 67.1 kg (148 lb)   07/11/24 67.9 kg (149 lb 9.6 oz)   04/11/24 67 kg (147 lb 11.3 oz)   01/04/24 67.3 kg (148 lb 4.8 oz)   09/25/23 66.6 kg (146 lb 12.8 oz)   01/17/23 66.7 kg (147 lb)     Constitutional: well-developed, appearing stated age; cooperative  Eyes: nl conjunctiva, sclera  ENT: nl external ears, nose, hearing, lips  Neck: no visible mass or thyroid enlargement  Resp: no shortness of breath with normal conversation  Psych: nl judgement, orientation, memory, affect.           Data:   Imaging:  CT Bilateral Ankles  Impression:  1. Bilateral subtalar and  talonavicular arthritis left greater than right  2. Subtle cavovarus foot deformity bilaterally  3. Bilateral peroneal tendinitis left greater than right  4. Bilateral gastrocnemius contractures    X-ray bilateral hands and wrist 1/17/2023  IMPRESSION:  1.  Right hand and wrist: Normal joint spacing and alignment. No  periarticular erosion. No fracture.  2.  Left hand and wrist: Normal joint spacing and alignment. No  periarticular erosion. No fracture.    Laboratory:  1/17/2023  Creatinine 0.65, GFR greater than 90  Albumin 4.8  ALT 17, AST 20  CRP less than 3.0  CCP antibody 1.6  Rheumatoid factor less than 7  White blood cell count 6.8, hemoglobin 13.4, plate count 171  Sed rate 10      6/29/2023  TB negative  Hep B and Hep non-reactive     10/23/2023  Creatinine 0.65, GFR greater than 90  Albumin 4.9  ALT 53, AST 43  CRP less than 3.0  White blood cell count 4.9, hemoglobin 12.5, platelets 176  Sed rate 11    1/4/24  Creatinine 0.64, GFR >90  Albumin 4.7   ALT 49, AST 25  WBC 6.2, Hgb 13.7, Plt 174    5/9/24  Creatinine 0.63, GFR >90  Albumin 4.5  ALT 18, AST 20  WBC 8.0, Hgb 12.6, Plt 173    11/11/2024  Creatinine 0.72, GFR >90  Albumin 4.7  ALT 18, AST 28  WBC 5.9, Hgb 13.6, Plt 205

## 2025-03-06 ENCOUNTER — OFFICE VISIT (OUTPATIENT)
Dept: RHEUMATOLOGY | Facility: CLINIC | Age: 40
End: 2025-03-06
Payer: COMMERCIAL

## 2025-03-06 VITALS
WEIGHT: 145.4 LBS | OXYGEN SATURATION: 100 % | SYSTOLIC BLOOD PRESSURE: 113 MMHG | BODY MASS INDEX: 22.11 KG/M2 | HEART RATE: 79 BPM | DIASTOLIC BLOOD PRESSURE: 77 MMHG

## 2025-03-06 DIAGNOSIS — Z82.49 FAMILY HISTORY OF CARDIOVASCULAR DISEASE: Primary | ICD-10-CM

## 2025-03-06 DIAGNOSIS — Z87.39 HISTORY OF JUVENILE RHEUMATOID ARTHRITIS: ICD-10-CM

## 2025-03-06 DIAGNOSIS — Z79.899 HIGH RISK MEDICATION USE: ICD-10-CM

## 2025-03-06 DIAGNOSIS — M06.09 SERONEGATIVE RHEUMATOID ARTHRITIS OF MULTIPLE SITES (H): ICD-10-CM

## 2025-03-06 NOTE — PATIENT INSTRUCTIONS
After Visit Instructions:     Thank you for coming to [facility] for your care. It is my goal to partner with you to help you reach your optimal state of health.       Plan:     Schedule follow-up with Sagrario Stone PA-C in 4-6 months.   Labs: CBC, creatinine, Albumin, AST, ALT, CRP and Sed Rate tomorrow or next week when you get your cholesterol checked  Referral made to primary care  Medication recommendations:   Continue Humira 40mg/0.4mL SubQ every 14 days      Sagrario Stone PA-C  [facility]  [facility]    Contact information: [facility]  Clinic Number:  [phone]  Please call or send a [email or web address] message with any questions about your care

## 2025-03-06 NOTE — PROGRESS NOTES
Rheumatology Clinic Visit  [facility]  ESSENCE Durán     Kailyn Diop MRN# [id number]   YOB: 1985 Age: 39 year old   Date of Visit: 03/06/2025  Primary care provider: No primary care provider on file.          Assessment and Plan:     1. Seronegative Rheumatoid arthritis  2. History of juvenile rheumatoid arthritis  3. High risk medication use    Patient presents today for follow-up regarding her seronegative rheumatoid arthritis.  She states that she has been doing well without the hydroxychloroquine and methotrexate on the Humira monotherapy.  She has not had any flares of her joints.  Physical examination today does not show any active synovitis or dactylitis.  She has full fist formation.  Labs were in November were stable.    Of note recently her brother and her dad were diagnosed with cardiovascular disease.  I placed an order to have her lipids checked but I also placed a referral for her to establish with a primary care provider as they may want to do more extensive testing.  When she comes in for her lipid panel we will check her monitoring labs as well.  She will follow-up with me in 4 to 6 months, sooner if needed.    Plan:     Schedule follow-up with Sagrario Stone PA-C in 4-6 months.   Labs: CBC, creatinine, Albumin, AST, ALT, CRP and Sed Rate tomorrow or next week when you get your cholesterol checked  Referral made to primary care  Medication recommendations:   Continue Humira 40mg/0.4mL SubQ every 14 days    ESSENCE Durán  Rheumatology         History of Present Illness:   Kailyn Diop presents for evaluation of ankle/foot pain. 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     Rheumatological history:  Previous medications tried: Methotrexate (SubQ) (felt ill), plaquenil, Prednisone (prednisone) and sulindac, Enbrel (initially effective, then on restart not effective), Sulfasalazine (not effective in combination with Enbrel), hydroxychloroquine (stopped as she was  doing quite well on Humira)  Current medications: Humira every 14 days,    Interval history March 6, 2025:  She has been doing good. She is doing well without the hydroxychloroquine.     Interval history December 5, 2024:  For almost 2 weeks, she has been having numbness in the palms of her hands and eye twitching and eye pressure. The eye twitching was significant yesterday.  It is not all day long, but she does notice it daily for 2 weeks.     Interval history July 11, 2024:  She thinks that yesterday she was feeling better, but she worked yesterday and feels increased symptoms today. She tolerates the Hydroxychloroquine well. No side effects.     Interval history April 11, 2024:  She has more pain in her feet today due to working last night. She has more pain in her left foot that is consistent. She has had a CT of her foot with [place] and was told that there is damage and she would need a surgery. She is trying a low inflammation diet and does notice improvement overall in her body.     Interval history January 4, 2024:  She had noted some improvement with the Sulfasalazine but not a significant amount. She ran out of Enbrel 1 week ago and is interested in changing treatment. She has stiffness in the mornings, particularly if she was more active the day before. She has not noticed any swelling. She has not had any infections. No personal history of stroke or heart attack.       Interval history September 25, 2023:  The enbrel is going okay. She states that she is not pain free and the pain is more severe than she expected. She is unsure what the cause of the pain. She did have pain free days after the steroid injections at [place]. Most of her pain is in her left ankle. Wrist is doing okay as are the knees.     Interval history June 29, 2023:  She states that the Prednisone was quite helpful.She would like to go back on Enbrel as it worked well for her in the past.  Unfortunately now with being off of the  prednisone she has started to have an increase in her joint symptoms.    HPI from Consult:  She reports that she stopped treating arthritis around age 18. She has had 3 pregnancies in the last 6 years. She states that with the added weight of pregnancy, she would have increased pain in her foot and ankles. She was seen by [facility]. A CT scan was done to see if the tendons were the cause. She was told there was arthritis and she should see Rheum. She was given cortisone injections and states that this was very helpful. She states that the pain is starting to come back. It is not at the level that it was at before. She has some swelling around the ball of her ankle. She states that she has some wrist issues as well as she is unable to bear too much weight on them. She states that her ankles have always been stiff. She has some in her fingers as well. She feels that her joints feel similar to what they did when she was a child. No oral prednisone recently.No skin rashes or mouth sores. No hair loss. No history of pericarditis or pleuritis. No cancer history.     For her DAKSHA, she had issues with her ankles, knees and wrists. She had reconstructive jaw surgery due to the activity in her jaw. She states that the last thing she was on was Enbrel. She states that she was on Methotrexate (SubQ), plaquenil, Prednisone and sulindac. No allergic reactions to these medications.          Review of Systems:     Constitutional: negative  Skin: negative  Eyes: negative  Ears/Nose/Throat: negative  Respiratory: No shortness of breath, dyspnea on exertion, cough, or hemoptysis  Cardiovascular: negative  Gastrointestinal: negative  Genitourinary: negative  Musculoskeletal: as above  Neurologic: negative  Psychiatric: negative  Hematologic/Lymphatic/Immunologic: negative  Endocrine: negative         Active Problem List:   There are no active problems to display for this patient.           Past Medical History:   No past medical history on  file.  No past surgical history on file.         Social History:     Social History     Socioeconomic History    Marital status:      Spouse name: Not on file    Number of children: Not on file    Years of education: Not on file    Highest education level: Not on file   Occupational History    Not on file   Tobacco Use    Smoking status: Former     Types: Cigarettes    Smokeless tobacco: Never   Substance and Sexual Activity    Alcohol use: Not on file    Drug use: Not on file    Sexual activity: Not on file   Other Topics Concern    Not on file   Social History Narrative    Not on file     Social Drivers of Health     Financial Resource Strain: Not on file   Food Insecurity: Not on file   Transportation Needs: Not on file   Physical Activity: Not on file   Stress: Not on file   Social Connections: Not on file   Interpersonal Safety: Not on file   Housing Stability: Not on file          Family History:   No family history on file.         Allergies:   No Known Allergies         Medications:     Current Outpatient Medications   Medication Sig Dispense Refill    Adalimumab (HUMIRA *CF*) 40 MG/0.4ML pen kit Inject 0.4 mLs (40 mg) subcutaneously every 14 days. Hold for signs of infection, then seek medical attention. 1 each 5    buPROPion (WELLBUTRIN XL) 300 MG 24 hr tablet Take 300 mg by mouth daily      medroxyPROGESTERone (DEPO-PROVERA) 150 MG/ML IM injection Inject 150 mg into the muscle      tretinoin (RETIN-A) 0.025 % external cream               Physical Exam:   Blood pressure 113/77, pulse 79, weight 66 kg (145 lb 6.4 oz), SpO2 100%.  Wt Readings from Last 6 Encounters:   03/06/25 66 kg (145 lb 6.4 oz)   12/05/24 67.1 kg (148 lb)   07/11/24 67.9 kg (149 lb 9.6 oz)   04/11/24 67 kg (147 lb 11.3 oz)   01/04/24 67.3 kg (148 lb 4.8 oz)   09/25/23 66.6 kg (146 lb 12.8 oz)     Constitutional: well-developed, appearing stated age; cooperative  Eyes: nl conjunctiva, sclera  ENT: nl external ears, nose, hearing,  lips  Neck: no visible mass or thyroid enlargement  Resp: no shortness of breath with normal conversation  MSK: No active synovitis or dactylitis.  She has full fist formation.  No tenderness to palpation over her MCPs or PIPs bilaterally.  Psych: nl judgement, orientation, memory, affect.           Data:   Imaging:  CT Bilateral Ankles  Impression:  1. Bilateral subtalar and talonavicular arthritis left greater than right  2. Subtle cavovarus foot deformity bilaterally  3. Bilateral peroneal tendinitis left greater than right  4. Bilateral gastrocnemius contractures    X-ray bilateral hands and wrist 1/17/2023  IMPRESSION:  1.  Right hand and wrist: Normal joint spacing and alignment. No  periarticular erosion. No fracture.  2.  Left hand and wrist: Normal joint spacing and alignment. No  periarticular erosion. No fracture.    Laboratory:  1/17/2023  Creatinine 0.65, GFR greater than 90  Albumin 4.8  ALT 17, AST 20  CRP less than 3.0  CCP antibody 1.6  Rheumatoid factor less than 7  White blood cell count 6.8, hemoglobin 13.4, plate count 171  Sed rate 10      6/29/2023  TB negative  Hep B and Hep non-reactive     10/23/2023  Creatinine 0.65, GFR greater than 90  Albumin 4.9  ALT 53, AST 43  CRP less than 3.0  White blood cell count 4.9, hemoglobin 12.5, platelets 176  Sed rate 11    1/4/24  Creatinine 0.64, GFR >90  Albumin 4.7   ALT 49, AST 25  WBC 6.2, Hgb 13.7, Plt 174    5/9/24  Creatinine 0.63, GFR >90  Albumin 4.5  ALT 18, AST 20  WBC 8.0, Hgb 12.6, Plt 173    11/11/2024  Creatinine 0.72, GFR >90  Albumin 4.7  ALT 18, AST 28  WBC 5.9, Hgb 13.6, Plt 205

## 2025-05-27 DIAGNOSIS — M06.09 SERONEGATIVE RHEUMATOID ARTHRITIS OF MULTIPLE SITES (H): ICD-10-CM

## 2025-05-27 RX ORDER — ADALIMUMAB-ATTO 40 MG/.4ML
40 INJECTION SUBCUTANEOUS
Qty: 0.8 ML | Refills: 2 | Status: SHIPPED | OUTPATIENT
Start: 2025-05-27

## 2025-05-27 NOTE — TELEPHONE ENCOUNTER
Per fax:    Effective 7/1/25 Humira will not be covered by patient's plan.  Current prior authorization approvals have been updated to include the FDA-approved biosimilar below.  In order for your patient to maintain coverage after 7/1/25, please send a new Rx for covered biosimilar for Humira.    Covered alternative:  adalimumab-atto (Amjevita by Nuvaila 80mg/ 0.8mL, 40mg/ 0.4mL, 20mg /0.2mL)    If the patient wishes to pursue continued use of Humira due to medical necessity, please initiate a PA after 7/1/25.